# Patient Record
Sex: FEMALE | Race: WHITE | Employment: PART TIME | URBAN - NONMETROPOLITAN AREA
[De-identification: names, ages, dates, MRNs, and addresses within clinical notes are randomized per-mention and may not be internally consistent; named-entity substitution may affect disease eponyms.]

---

## 2017-03-21 ENCOUNTER — OFFICE VISIT (OUTPATIENT)
Dept: PRIMARY CARE CLINIC | Age: 57
End: 2017-03-21
Payer: MEDICAID

## 2017-03-21 VITALS
TEMPERATURE: 97.8 F | OXYGEN SATURATION: 96 % | HEIGHT: 67 IN | WEIGHT: 137 LBS | BODY MASS INDEX: 21.5 KG/M2 | HEART RATE: 77 BPM | SYSTOLIC BLOOD PRESSURE: 114 MMHG | DIASTOLIC BLOOD PRESSURE: 74 MMHG

## 2017-03-21 DIAGNOSIS — F98.8 ADD (ATTENTION DEFICIT DISORDER): ICD-10-CM

## 2017-03-21 DIAGNOSIS — Z00.00 ROUTINE PHYSICAL EXAMINATION: Primary | ICD-10-CM

## 2017-03-21 DIAGNOSIS — Z23 NEED FOR PNEUMOCOCCAL VACCINE: ICD-10-CM

## 2017-03-21 DIAGNOSIS — Z12.31 ENCOUNTER FOR SCREENING MAMMOGRAM FOR BREAST CANCER: ICD-10-CM

## 2017-03-21 DIAGNOSIS — Z23 NEED FOR INFLUENZA VACCINATION: ICD-10-CM

## 2017-03-21 DIAGNOSIS — F32.9 REACTIVE DEPRESSION: ICD-10-CM

## 2017-03-21 PROCEDURE — 90471 IMMUNIZATION ADMIN: CPT | Performed by: NURSE PRACTITIONER

## 2017-03-21 PROCEDURE — 90732 PPSV23 VACC 2 YRS+ SUBQ/IM: CPT | Performed by: NURSE PRACTITIONER

## 2017-03-21 PROCEDURE — 99386 PREV VISIT NEW AGE 40-64: CPT | Performed by: NURSE PRACTITIONER

## 2017-03-21 PROCEDURE — 90472 IMMUNIZATION ADMIN EACH ADD: CPT | Performed by: NURSE PRACTITIONER

## 2017-03-21 PROCEDURE — 90630 INFLUENZA, QUADRIVALENT, INTRADERMAL, PF (FLUZONE QUADRIVALENT PF ID): CPT | Performed by: NURSE PRACTITIONER

## 2017-03-21 RX ORDER — IBUPROFEN 200 MG
200 TABLET ORAL EVERY 6 HOURS PRN
COMMUNITY

## 2017-03-21 RX ORDER — DEXTROAMPHETAMINE SACCHARATE, AMPHETAMINE ASPARTATE, DEXTROAMPHETAMINE SULFATE AND AMPHETAMINE SULFATE 7.5; 7.5; 7.5; 7.5 MG/1; MG/1; MG/1; MG/1
30 TABLET ORAL 2 TIMES DAILY
COMMUNITY
Start: 2017-03-06 | End: 2018-10-22 | Stop reason: ALTCHOICE

## 2017-03-21 RX ORDER — ESCITALOPRAM OXALATE 10 MG/1
10 TABLET ORAL DAILY
Qty: 30 TABLET | Refills: 11 | Status: SHIPPED | OUTPATIENT
Start: 2017-03-21

## 2017-03-21 ASSESSMENT — ENCOUNTER SYMPTOMS
NAUSEA: 0
COUGH: 0
CONSTIPATION: 0
SINUS PRESSURE: 0
SHORTNESS OF BREATH: 0
TROUBLE SWALLOWING: 0
ABDOMINAL PAIN: 0
SORE THROAT: 0
RHINORRHEA: 0
DIARRHEA: 0
VOMITING: 0

## 2017-03-21 ASSESSMENT — PATIENT HEALTH QUESTIONNAIRE - PHQ9
1. LITTLE INTEREST OR PLEASURE IN DOING THINGS: 1
8. MOVING OR SPEAKING SO SLOWLY THAT OTHER PEOPLE COULD HAVE NOTICED. OR THE OPPOSITE, BEING SO FIGETY OR RESTLESS THAT YOU HAVE BEEN MOVING AROUND A LOT MORE THAN USUAL: 0
6. FEELING BAD ABOUT YOURSELF - OR THAT YOU ARE A FAILURE OR HAVE LET YOURSELF OR YOUR FAMILY DOWN: 3
10. IF YOU CHECKED OFF ANY PROBLEMS, HOW DIFFICULT HAVE THESE PROBLEMS MADE IT FOR YOU TO DO YOUR WORK, TAKE CARE OF THINGS AT HOME, OR GET ALONG WITH OTHER PEOPLE: 1
5. POOR APPETITE OR OVEREATING: 3
4. FEELING TIRED OR HAVING LITTLE ENERGY: 3
7. TROUBLE CONCENTRATING ON THINGS, SUCH AS READING THE NEWSPAPER OR WATCHING TELEVISION: 0
SUM OF ALL RESPONSES TO PHQ QUESTIONS 1-9: 13
9. THOUGHTS THAT YOU WOULD BE BETTER OFF DEAD, OR OF HURTING YOURSELF: 0
SUM OF ALL RESPONSES TO PHQ9 QUESTIONS 1 & 2: 4
2. FEELING DOWN, DEPRESSED OR HOPELESS: 3
3. TROUBLE FALLING OR STAYING ASLEEP: 0

## 2017-03-23 DIAGNOSIS — Z00.00 ROUTINE PHYSICAL EXAMINATION: ICD-10-CM

## 2017-03-23 LAB
ALBUMIN SERPL-MCNC: 4.3 G/DL (ref 3.5–5.2)
ALP BLD-CCNC: 101 U/L (ref 35–104)
ALT SERPL-CCNC: 10 U/L (ref 5–33)
ANION GAP SERPL CALCULATED.3IONS-SCNC: 19 MMOL/L (ref 7–19)
AST SERPL-CCNC: 22 U/L (ref 5–32)
BASOPHILS ABSOLUTE: 0 K/UL (ref 0–0.2)
BASOPHILS RELATIVE PERCENT: 0.4 % (ref 0–1)
BILIRUB SERPL-MCNC: 0.4 MG/DL (ref 0.2–1.2)
BUN BLDV-MCNC: 11 MG/DL (ref 6–20)
CALCIUM SERPL-MCNC: 9.8 MG/DL (ref 8.6–10)
CHLORIDE BLD-SCNC: 102 MMOL/L (ref 98–111)
CHOLESTEROL, TOTAL: 219 MG/DL (ref 160–199)
CO2: 22 MMOL/L (ref 22–29)
CREAT SERPL-MCNC: 0.7 MG/DL (ref 0.5–0.9)
EOSINOPHILS ABSOLUTE: 0.2 K/UL (ref 0–0.6)
EOSINOPHILS RELATIVE PERCENT: 2.3 % (ref 0–5)
GFR NON-AFRICAN AMERICAN: >60
GLOBULIN: 3.7 G/DL
GLUCOSE BLD-MCNC: 87 MG/DL (ref 74–109)
HCT VFR BLD CALC: 39.5 % (ref 37–47)
HDLC SERPL-MCNC: 60 MG/DL (ref 65–121)
HEMOGLOBIN: 13.1 G/DL (ref 12–16)
LDL CHOLESTEROL CALCULATED: 145 MG/DL
LYMPHOCYTES ABSOLUTE: 1.7 K/UL (ref 1.1–4.5)
LYMPHOCYTES RELATIVE PERCENT: 22.8 % (ref 20–40)
MCH RBC QN AUTO: 31.6 PG (ref 27–31)
MCHC RBC AUTO-ENTMCNC: 33.2 G/DL (ref 33–37)
MCV RBC AUTO: 95.4 FL (ref 81–99)
MONOCYTES ABSOLUTE: 0.9 K/UL (ref 0–0.9)
MONOCYTES RELATIVE PERCENT: 11.9 % (ref 0–10)
NEUTROPHILS ABSOLUTE: 4.6 K/UL (ref 1.5–7.5)
NEUTROPHILS RELATIVE PERCENT: 62.6 % (ref 50–65)
PDW BLD-RTO: 13.5 % (ref 11.5–14.5)
PLATELET # BLD: 298 K/UL (ref 130–400)
PMV BLD AUTO: 10.6 FL (ref 7.4–10.4)
POTASSIUM SERPL-SCNC: 4.3 MMOL/L (ref 3.5–5)
RBC # BLD: 4.14 M/UL (ref 4.2–5.4)
SODIUM BLD-SCNC: 143 MMOL/L (ref 136–145)
T4 FREE: 0.9 NG/ML (ref 0.9–1.7)
TOTAL PROTEIN: 8 G/DL (ref 6.6–8.7)
TRIGL SERPL-MCNC: 69 MG/DL (ref 150–199)
TSH SERPL DL<=0.05 MIU/L-ACNC: 2.89 UIU/ML (ref 0.27–4.2)
WBC # BLD: 7.4 K/UL (ref 4.8–10.8)

## 2017-03-24 ENCOUNTER — TELEPHONE (OUTPATIENT)
Dept: PRIMARY CARE CLINIC | Age: 57
End: 2017-03-24

## 2017-03-24 DIAGNOSIS — E78.2 MIXED HYPERLIPIDEMIA: Primary | ICD-10-CM

## 2017-03-28 ENCOUNTER — TELEPHONE (OUTPATIENT)
Dept: PRIMARY CARE CLINIC | Age: 57
End: 2017-03-28

## 2017-03-28 DIAGNOSIS — Z12.11 COLON CANCER SCREENING: Primary | ICD-10-CM

## 2017-03-28 RX ORDER — ATORVASTATIN CALCIUM 40 MG/1
40 TABLET, FILM COATED ORAL DAILY
Qty: 30 TABLET | Refills: 3 | Status: SHIPPED | OUTPATIENT
Start: 2017-03-28 | End: 2017-04-13 | Stop reason: SDUPTHER

## 2017-04-13 ENCOUNTER — HOSPITAL ENCOUNTER (OUTPATIENT)
Dept: GENERAL RADIOLOGY | Age: 57
Discharge: HOME | End: 2017-04-13
Payer: COMMERCIAL

## 2017-04-13 ENCOUNTER — TELEPHONE (OUTPATIENT)
Dept: PRIMARY CARE CLINIC | Age: 57
End: 2017-04-13

## 2017-04-13 ENCOUNTER — TELEPHONE (OUTPATIENT)
Dept: GASTROENTEROLOGY | Age: 57
End: 2017-04-13

## 2017-04-13 ENCOUNTER — OFFICE VISIT (OUTPATIENT)
Dept: PRIMARY CARE CLINIC | Age: 57
End: 2017-04-13
Payer: MEDICAID

## 2017-04-13 VITALS
HEART RATE: 74 BPM | OXYGEN SATURATION: 98 % | HEIGHT: 66 IN | TEMPERATURE: 97.9 F | WEIGHT: 132 LBS | BODY MASS INDEX: 21.21 KG/M2 | DIASTOLIC BLOOD PRESSURE: 64 MMHG | SYSTOLIC BLOOD PRESSURE: 108 MMHG

## 2017-04-13 DIAGNOSIS — R07.9 CHEST PAIN, UNSPECIFIED TYPE: Primary | ICD-10-CM

## 2017-04-13 DIAGNOSIS — R93.89 ABNORMAL X-RAY: ICD-10-CM

## 2017-04-13 DIAGNOSIS — R93.89 ABNORMAL X-RAY: Primary | ICD-10-CM

## 2017-04-13 DIAGNOSIS — Z12.11 SCREENING FOR COLON CANCER: ICD-10-CM

## 2017-04-13 DIAGNOSIS — R07.9 CHEST PAIN, UNSPECIFIED TYPE: ICD-10-CM

## 2017-04-13 PROCEDURE — 93000 ELECTROCARDIOGRAM COMPLETE: CPT | Performed by: NURSE PRACTITIONER

## 2017-04-13 PROCEDURE — 71020 XR CHEST STANDARD TWO VW: CPT

## 2017-04-13 PROCEDURE — 99213 OFFICE O/P EST LOW 20 MIN: CPT | Performed by: NURSE PRACTITIONER

## 2017-04-13 RX ORDER — ATORVASTATIN CALCIUM 40 MG/1
40 TABLET, FILM COATED ORAL DAILY
Qty: 30 TABLET | Refills: 11 | Status: SHIPPED | OUTPATIENT
Start: 2017-04-13 | End: 2018-10-22 | Stop reason: SDUPTHER

## 2017-04-13 ASSESSMENT — ENCOUNTER SYMPTOMS
SHORTNESS OF BREATH: 1
VOMITING: 0
TROUBLE SWALLOWING: 0
DIARRHEA: 0
COUGH: 0
NAUSEA: 0
RHINORRHEA: 0
SORE THROAT: 0
ABDOMINAL PAIN: 0
SINUS PRESSURE: 0
CONSTIPATION: 0

## 2017-04-18 DIAGNOSIS — R93.89 ABNORMAL X-RAY: Primary | ICD-10-CM

## 2017-04-27 ENCOUNTER — HOSPITAL ENCOUNTER (OUTPATIENT)
Dept: GENERAL RADIOLOGY | Age: 57
Discharge: HOME | End: 2017-04-27
Payer: MEDICAID

## 2017-04-27 DIAGNOSIS — R93.89 ABNORMAL X-RAY: ICD-10-CM

## 2017-04-27 PROCEDURE — 71250 CT THORAX DX C-: CPT

## 2017-04-28 ENCOUNTER — TELEPHONE (OUTPATIENT)
Dept: PRIMARY CARE CLINIC | Age: 57
End: 2017-04-28

## 2017-05-02 ENCOUNTER — OFFICE VISIT (OUTPATIENT)
Dept: PRIMARY CARE CLINIC | Age: 57
End: 2017-05-02
Payer: MEDICAID

## 2017-05-02 VITALS
SYSTOLIC BLOOD PRESSURE: 112 MMHG | HEIGHT: 66 IN | HEART RATE: 83 BPM | WEIGHT: 132.75 LBS | TEMPERATURE: 98.3 F | OXYGEN SATURATION: 97 % | DIASTOLIC BLOOD PRESSURE: 74 MMHG | BODY MASS INDEX: 21.33 KG/M2

## 2017-05-02 DIAGNOSIS — J84.9 INTERSTITIAL LUNG DISEASE (HCC): ICD-10-CM

## 2017-05-02 DIAGNOSIS — J43.9 PULMONARY EMPHYSEMA, UNSPECIFIED EMPHYSEMA TYPE (HCC): ICD-10-CM

## 2017-05-02 DIAGNOSIS — J84.9 INTERSTITIAL LUNG DISEASE (HCC): Primary | ICD-10-CM

## 2017-05-02 DIAGNOSIS — E78.2 MIXED HYPERLIPIDEMIA: ICD-10-CM

## 2017-05-02 DIAGNOSIS — Z72.0 TOBACCO USE: ICD-10-CM

## 2017-05-02 LAB
ALBUMIN SERPL-MCNC: 4.1 G/DL (ref 3.5–5.2)
ALP BLD-CCNC: 104 U/L (ref 35–104)
ALT SERPL-CCNC: 8 U/L (ref 5–33)
ANION GAP SERPL CALCULATED.3IONS-SCNC: 14 MMOL/L (ref 7–19)
AST SERPL-CCNC: 19 U/L (ref 5–32)
BILIRUB SERPL-MCNC: 0.3 MG/DL (ref 0.2–1.2)
BILIRUBIN DIRECT: 0.1 MG/DL (ref 0–0.3)
BILIRUBIN, INDIRECT: 0.2 MG/DL (ref 0.1–1)
BUN BLDV-MCNC: 7 MG/DL (ref 6–20)
C-REACTIVE PROTEIN: 1.6 MG/L (ref 0–5)
CALCIUM SERPL-MCNC: 9.3 MG/DL (ref 8.6–10)
CHLORIDE BLD-SCNC: 102 MMOL/L (ref 98–111)
CO2: 27 MMOL/L (ref 22–29)
CREAT SERPL-MCNC: 0.7 MG/DL (ref 0.5–0.9)
GFR NON-AFRICAN AMERICAN: >60
GLOBULIN: 3.1 G/DL
GLUCOSE BLD-MCNC: 60 MG/DL (ref 74–109)
POTASSIUM SERPL-SCNC: 4 MMOL/L (ref 3.5–5)
RHEUMATOID FACTOR: <10 IU/ML
SODIUM BLD-SCNC: 143 MMOL/L (ref 136–145)
TOTAL PROTEIN: 7.2 G/DL (ref 6.6–8.7)

## 2017-05-02 PROCEDURE — 99213 OFFICE O/P EST LOW 20 MIN: CPT | Performed by: NURSE PRACTITIONER

## 2017-05-02 RX ORDER — NICOTINE 21 MG/24HR
1 PATCH, TRANSDERMAL 24 HOURS TRANSDERMAL EVERY 24 HOURS
Qty: 30 PATCH | Refills: 3 | Status: SHIPPED | OUTPATIENT
Start: 2017-05-02

## 2017-05-02 ASSESSMENT — ENCOUNTER SYMPTOMS
VOMITING: 0
TROUBLE SWALLOWING: 0
SHORTNESS OF BREATH: 1
COUGH: 0
RHINORRHEA: 0
SINUS PRESSURE: 0
ABDOMINAL PAIN: 0
SORE THROAT: 0
DIARRHEA: 0
CONSTIPATION: 0
NAUSEA: 0

## 2017-05-04 LAB — ANA IGG, ELISA: NORMAL

## 2017-05-05 LAB
ENA TO SSB (LA) ANTIBODY: 1 AU/ML (ref 0–40)
SSA 52 (RO) (ENA) AB, IGG: 1 AU/ML (ref 0–40)
SSA 60 (RO) (ENA) AB, IGG: 1 AU/ML (ref 0–40)

## 2017-05-08 ENCOUNTER — TELEPHONE (OUTPATIENT)
Dept: PRIMARY CARE CLINIC | Age: 57
End: 2017-05-08

## 2017-05-08 LAB — SEDIMENTATION RATE, ERYTHROCYTE: 30 MM/HR (ref 0–25)

## 2017-05-09 ENCOUNTER — TELEPHONE (OUTPATIENT)
Dept: PRIMARY CARE CLINIC | Age: 57
End: 2017-05-09

## 2018-03-08 ENCOUNTER — HOSPITAL ENCOUNTER (OUTPATIENT)
Dept: GENERAL RADIOLOGY | Age: 58
Discharge: HOME | End: 2018-03-08
Payer: MEDICAID

## 2018-03-08 DIAGNOSIS — J98.4 SCARRING OF LUNG: ICD-10-CM

## 2018-03-08 PROCEDURE — 71250 CT THORAX DX C-: CPT

## 2018-10-01 ENCOUNTER — OFFICE VISIT (OUTPATIENT)
Dept: CARDIOLOGY | Age: 58
End: 2018-10-01
Payer: MEDICAID

## 2018-10-01 VITALS
DIASTOLIC BLOOD PRESSURE: 66 MMHG | SYSTOLIC BLOOD PRESSURE: 126 MMHG | WEIGHT: 130 LBS | HEART RATE: 60 BPM | HEIGHT: 66 IN | BODY MASS INDEX: 20.89 KG/M2

## 2018-10-01 DIAGNOSIS — R00.2 PALPITATIONS: ICD-10-CM

## 2018-10-01 DIAGNOSIS — E78.5 HYPERLIPIDEMIA, UNSPECIFIED HYPERLIPIDEMIA TYPE: ICD-10-CM

## 2018-10-01 DIAGNOSIS — J43.9 PULMONARY EMPHYSEMA, UNSPECIFIED EMPHYSEMA TYPE (HCC): Primary | ICD-10-CM

## 2018-10-01 DIAGNOSIS — R06.09 DOE (DYSPNEA ON EXERTION): ICD-10-CM

## 2018-10-01 DIAGNOSIS — Z82.49 FAMILY HISTORY OF CORONARY ARTERY DISEASE: ICD-10-CM

## 2018-10-01 PROCEDURE — 99213 OFFICE O/P EST LOW 20 MIN: CPT | Performed by: NURSE PRACTITIONER

## 2018-10-01 PROCEDURE — 0296T PR EXT ECG > 48HR TO 21 DAY RCRD W/CONECT INTL RCRD: CPT | Performed by: NURSE PRACTITIONER

## 2018-10-01 PROCEDURE — 93000 ELECTROCARDIOGRAM COMPLETE: CPT | Performed by: NURSE PRACTITIONER

## 2018-10-01 NOTE — PATIENT INSTRUCTIONS
not smoke or eat a heavy meal before this test.  · Wear flat, comfortable shoes (no bedroom slippers) and loose, lightweight shorts or sweatpants. Walking or running shoes are best.  · Tell your doctor if:  Tressashan Horowitzer You are taking any medicines. ¨ You are taking medicine for an erection problem, such as sildenafil (Viagra), tadalafil (Cialis), and vardenafil (Levitra). You may need to take nitroglycerin during this test, which can cause a serious reaction if you have taken a medicine for an erection problem within the past 48 hours. ¨ You have had bleeding problems, or if you take aspirin or some other blood thinner. ¨ You have joint problems in your hips or legs that may make it hard for you to exercise. ¨ You have a heart valve problem. What happens during the test?  You will first have an echocardiogram before exercising. This is called the baseline. You then exercise for a specific amount of time and then have another echocardiogram.  · You will take off all or most of your clothes and change into a gown. · You will lie on your back or on your left side on a bed or table. · You may receive medicine through a vein (intravenously, or IV). The IV can be used to give you a contrast material, which helps your doctor get good views of your heart. · Small pads or patches (electrodes) will be taped to your arms and legs to record your heart rate during the test.  · A small amount of gel will be rubbed on the left side of your chest to help  the sound waves. · The transducer will be pressed firmly against your chest and moved slowly back and forth. It is usually moved to different areas on your chest to get specific views of your heart. · You will be asked to do several things, such as hold very still, breathe in and out very slowly, hold your breath, or lie on your left side. This test usually takes 30 to 60 minutes.   When the echocardiogram is finished, you will exercise and then have another echocardiogram. If you are not able to exercise, you may be given medicine that stimulates your heart to beat harder and faster, as if you were exercising. You most likely will either walk on a treadmill or pedal a stationary bicycle. While you exercise:  · Your heart rate and blood pressure are recorded. · You might be asked to use numbers to say how hard you are exercising. The higher the number, the harder you think you are exercising. · You will continue to exercise until you or your doctor feels you need to stop. · You will then lie on a bed or table, and another echocardiogram will be done. An exercise stress echocardiogram takes about 30 to 60 minutes. What else should you know about the test?  · No electricity passes through your body during the test. There is no danger of getting an electrical shock. · During the tests, tell your doctor if:  ¨ You have chest pain. ¨ You are very short of breath. ¨ You are lightheaded. ¨ You have other symptoms. What happens after the test?  · You will be able to sit or lie down and rest.  · Your heart rate and blood pressure will be checked for about 5 to 10 minutes. · You can go back to your usual activities right away. When should you call for help? Call 911 anytime you think you may need emergency care. For example, call if:  · You passed out (lost consciousness). · You have symptoms of a heart attack, such as:  ¨ Chest pain or pressure. ¨ Sweating. ¨ Shortness of breath. ¨ Nausea or vomiting. ¨ Pain that spreads from the chest to the neck, jaw, or one or both shoulders or arms. ¨ Dizziness or lightheadedness. ¨ A fast or uneven pulse. After calling 911, chew 1 adult-strength aspirin. Wait for an ambulance. Do not try to drive yourself. Watch closely for changes in your health, and be sure to contact your doctor if:  · You do not get better as expected. Follow-up care is a key part of your treatment and safety.  Be sure to make and go to all appointments, and call about it. You know you should quit smoking, but where do you start? Knowing what you are up against can help you form a successful plan to quit smoking. The Mind and Body Connection   Smoking is addictiveboth physically and psychologically. The physical addiction can be traced to the nicotine in each cigarette. It hooks you just as completely as other drugs, say michael, and the withdrawal symptomscravings, anxiety , nausea, cramps, depression , and dizzinessare similar. Nicotine surges through the bloodstream and gives smokers a higha quick jolt that makes them think they feel better. But, what really happens is that smokers develop a tolerance for nicotine, which is why they increase from one pack a day to two packs a day. The psychological addiction is, in its own way, just as bad. Smoking becomes second nature, like blinking or breathing. If you consider that one pack of cigarettes can turn into 150 to 200 puffs a day, seven days a week, 52 weeks a year, you will see how hard it is to de-program yourself. The Key to Quitting   \"You know, there is no magic bullet, no device that will make it easy,\" says Dena Yin, who smoked for 13 years before quitting in 1989, and has written a book and taught seminars on quitting. But, you can quit. \"The thing to keep in mind is that almost everyone who quits has to try more than once,\" says Darryn Chavez MD, a past president of the [employer]. \"You should not be discouraged. It is more rare to quit on the first try than on the fifth\"   The key to quitting, say the experts, is patience, perseverance, and having a plan. How to Do It   Keep these points in mind when you quit:   Know Why Cash Flick a reason that you believe in, be it for your family or for yourself. If you do not believe in your reason, it is that much harder to quit.    Change Your Environment   Worry about not smoking for just one day, and not for the rest of your life. Besides, it gets easier to stave off the desire the longer you do not smoke. The nicotine will be gone from your system in 3-5 days, and after about a month the worst of the withdrawal symptoms will go away. Taper Off   Some studies show that a majority of permanent quitters achieved their goal by quitting \"cold turkey. \" But, there are many other options, like slowly decreasing the number of cigarettes you smoke. The key to tapering off is to cut down the number of cigarettes you smoke each day. One way to do this, says Amina, is to delay the first cigarette of the day. She recommends the two-hour approach. If you have your first smoke at 7 a.m., try holding out until 9 for a couple of days. Then, push it back until 11, and so on. By the end of four weeks, you will not be smoking at all. Whether you taper or quit cold, your goal must be the same: abstinence. If you choose to taper, do not let the process give you an excuse to delay the final step of quitting entirely. Set a quit day and stick to it. Overwhelm the Addiction   Think about the things that lead to lighting up, and do not do them. Get rid of the ashtrays at home. Do not come back from lunch 15 minutes early to sneak in a cigarette break. Avoid places where smoking is part of the atmosphere. Practice the Three D's   Delay; deep breathing; drink water. When you feel like a smoke, delay. Try to think of something else. Breathe deeply, and count to ten slowly as you do so. Drink water; aim for eight, eight-ounces (240 milliliters) a day, which helps flush the nicotine out of your system. Do something else, like chew gum, until the craving passes. Keep a Diary   This technique, which has also been used effectively with people who eat too much, is surprisingly effective. Each time you feel like a cigarette, write down the time of day, what you are doing, and how badly you want to smoke on a scale of 1 to 3, with 1 for the worst craving.  A varenicline   · Prescription antidepressant bupropion   · Alternative therapies, like hypnosis and acupuncture   · Smoking cessation classes   · Self-help programsFor example, web and computer-based programs are an option. You can find many programs online, like the American Lung Association's [place] From Smoking . There are also telephone quit lines, cell phone programs, and text messaging programs. To learn more about these options, visit smokefree. gov . · Group therapy   Trying a combination of these options may work best for you. For example, using a nicotine patch and going to group therapy may help you to become smoke-free. Reward Yourself for Succeeding   Quitting is hard and you deserve a reward for meeting your short-term goals like being quit for one week, two weeks, or a month. Give yourself something you really want but have been putting off getting. Remember how much money you are saving by not buying cigarettes!      Last Reviewed: March 2011 Patricio Dodson MD   Updated: 3/15/2011

## 2018-10-01 NOTE — PROGRESS NOTES
Dear Frances Early, DIONE - NP & Eloisa Cooks, APRN,    Thank you for allowing me to participate in the care of Ms. Byron Weldon. She presents today at the [address] [place] [address] at [facility] As you know, Ms. Kathe Franco is a 63 y.o. female with history of hyperlipidemia and smoking who presents with the chief complaint of being told she had an abnormal ECG. She went to [facility] in August for chest pain that was worse with breathing. They did an ECG which showed PVC. She was treated for pleurisy and sent to us for evaluation. Her pleurisy symptoms have resolved. She does have a significant family history of heart disease. Bother her brother and da had heart problems. She is a smoker She smokes 1 PPD. She has a history of hyperlipidemia and was on Lipitor in past but has not take this in 3 months. She states that she ran out. She is able to sweep and vacuum without any chest pain or SOA. She does have CARRASCO if she walks up a hill. She does have a fluttering and fast heart rate sensation that occurs on a weekly basis and lasts less than 10 seconds. She has no chest pain or SOA with this. Her BP has been controlled. She otherwise denies chest pain, SOA, CARRASCO, PND, orthopnea, syncope or near syncope. She has no other complaints. Review of Systems   Constitutional: Negative for fever, chills, diaphoresis, activity change, appetite change, fatigue and unexpected weight change. Eyes: Negative for photophobia, pain, redness and visual disturbance. Respiratory: Positive for CARRASCO. Negative for apnea, cough, chest tightness, wheezing and stridor. Cardiovascular: Positive for palpitations. Negative for chest pain and leg swelling. Gastrointestinal: Negative for abdominal distention. Genitourinary: Negative for dysuria, urgency and frequency. Musculoskeletal: Negative for myalgias, arthralgias and gait problem. Skin: Negative for color change, pallor, rash and wound.    Neurological: Negative for dizziness, tremors, speech difficulty, weakness and numbness. Hematological: Does not bruise/bleed easily. Psychiatric/Behavioral: Negative. Past Medical History:   Diagnosis Date    ADHD (attention deficit hyperactivity disorder)     Depression        Past Surgical History:   Procedure Laterality Date    HYSTERECTOMY      partial, dr Kasia [facility] in [facility]     KNEE SURGERY Right     knee scope       Family History   Problem Relation Age of Onset    Other Mother         brain aneurysm    Heart Disease Father         heart attack       Social History     Social History    Marital status: Unknown     Spouse name: N/A    Number of children: N/A    Years of education: N/A     Occupational History    Not on file. Social History Main Topics    Smoking status: Current Every Day Smoker     Packs/day: 1.00     Types: Cigarettes    Smokeless tobacco: Never Used    Alcohol use No    Drug use: No    Sexual activity: Not on file     Other Topics Concern    Not on file     Social History Narrative    No narrative on file       No Known Allergies      Current Outpatient Prescriptions:     ibuprofen (ADVIL;MOTRIN) 200 MG tablet, Take 200 mg by mouth every 6 hours as needed for Pain, Disp: , Rfl:     nicotine (NICODERM CQ) 21 MG/24HR, Place 1 patch onto the skin every 24 hours, Disp: 30 patch, Rfl: 3    atorvastatin (LIPITOR) 40 MG tablet, Take 1 tablet by mouth daily, Disp: 30 tablet, Rfl: 11    amphetamine-dextroamphetamine (ADDERALL) 30 MG tablet, Take 30 mg by mouth 2 times daily  Indications: filled by Dr Marine Ascencio in [facility] ., Disp: , Rfl:     escitalopram (LEXAPRO) 10 MG tablet, Take 1 tablet by mouth daily, Disp: 30 tablet, Rfl: 11    PE:  Vitals:    10/01/18 0847   BP: 126/66   Pulse: 60       Estimated body mass index is 20.98 kg/m² as calculated from the following:    Height as of this encounter: 5' 6\" (1.676 m). Weight as of this encounter: 130 lb (59 kg).     Constitutional: She is oriented to person, place, and time. She appears well-developed and well-nourished in no acute distress. Head: Normocephalic and atraumatic. Neck:  Neck supple without JVD present. Cardiovascular: Normal rate, Normal rhythm, normal heart sounds. no murmur ascultated. No gallop and no friction rub. No carotid bruits. No peripheral edema. Pulmonary/Chest:  Lungs clear to auscultation bilaterally without evidence of respiratory distress. She without wheezes. She without rales or ronchi. Musculoskeletal: Normal range of motion. Gait is normal.  Neurological: She is alert and oriented to person, place, and time. Skin: Skin is warm and dry without rash or pallor. Psychiatric: She has a normal mood and affect. Her behavior is normal. Thought content normal.     Lab Results   Component Value Date    CREATININE 0.7 05/02/2017    CREATININE 0.7 03/23/2017    HGB 13.1 03/23/2017     EKG- Fast Pace- 8/16/18   NSR, PVC, HR 79       Assessment, Recommendations, & Plan:  63 y.o. female with HLD, CARRASCO, & Palpitations    HLD- Will check a lipid panel and LFT's    CARRASCO- Stress echo    Palpitations- Ziopatch      Disposition - RTC in 5 weeks or sooner if needed      Please do not hesitate to contact me for any questions or concerns.     Sincerely yours,    DIONE Aceves

## 2018-10-01 NOTE — LETTER
[facility]  [address] [address]  Phone: [phone]  Fax: [phone]    DIONE Infante        October 1, 2018     Patient: Messi Guo   YOB: 1960   Date of Visit: 10/1/2018       To Whom it May Concern:    Messi Guo was seen in my clinic on 10/1/2018. She may return to work on 10/1/2018. If you have any questions or concerns, please don't hesitate to call.     Sincerely,         Svetlana Dumas APRN

## 2018-10-11 DIAGNOSIS — R06.09 DOE (DYSPNEA ON EXERTION): ICD-10-CM

## 2018-10-11 DIAGNOSIS — E78.5 HYPERLIPIDEMIA, UNSPECIFIED HYPERLIPIDEMIA TYPE: ICD-10-CM

## 2018-10-11 LAB
ALT SERPL-CCNC: 12 U/L (ref 5–33)
AST SERPL-CCNC: 18 U/L (ref 5–32)
CHOLESTEROL, TOTAL: 189 MG/DL (ref 160–199)
HDLC SERPL-MCNC: 49 MG/DL (ref 65–121)
LDL CHOLESTEROL CALCULATED: 124 MG/DL
TRIGL SERPL-MCNC: 80 MG/DL (ref 0–149)

## 2018-10-15 ENCOUNTER — HOSPITAL ENCOUNTER (OUTPATIENT)
Dept: NON INVASIVE DIAGNOSTICS | Age: 58
Discharge: HOME | End: 2018-10-15
Payer: MEDICAID

## 2018-10-15 DIAGNOSIS — R06.09 DOE (DYSPNEA ON EXERTION): ICD-10-CM

## 2018-10-18 ENCOUNTER — HOSPITAL ENCOUNTER (OUTPATIENT)
Dept: NON INVASIVE DIAGNOSTICS | Age: 58
Discharge: HOME | End: 2018-10-18
Payer: MEDICAID

## 2018-10-18 LAB
LV EF: 50 %
LVEF MODALITY: NORMAL

## 2018-10-18 PROCEDURE — 93350 STRESS TTE ONLY: CPT

## 2018-10-22 ENCOUNTER — OFFICE VISIT (OUTPATIENT)
Dept: CARDIOLOGY | Age: 58
End: 2018-10-22
Payer: MEDICAID

## 2018-10-22 VITALS
HEIGHT: 66 IN | SYSTOLIC BLOOD PRESSURE: 124 MMHG | HEART RATE: 66 BPM | WEIGHT: 128 LBS | DIASTOLIC BLOOD PRESSURE: 64 MMHG | BODY MASS INDEX: 20.57 KG/M2

## 2018-10-22 DIAGNOSIS — E78.5 HYPERLIPIDEMIA, UNSPECIFIED HYPERLIPIDEMIA TYPE: Primary | ICD-10-CM

## 2018-10-22 PROCEDURE — 99213 OFFICE O/P EST LOW 20 MIN: CPT | Performed by: NURSE PRACTITIONER

## 2018-10-22 RX ORDER — ATORVASTATIN CALCIUM 40 MG/1
40 TABLET, FILM COATED ORAL DAILY
Qty: 30 TABLET | Refills: 3 | Status: SHIPPED | OUTPATIENT
Start: 2018-10-22

## 2018-10-22 NOTE — PROGRESS NOTES
and oriented to person, place, and time. Skin: Skin is warm and dry without rash or pallor. Psychiatric: She has a normal mood and affect. Her behavior is normal. Thought content normal.     Lab Results   Component Value Date    CREATININE 0.7 05/02/2017    CREATININE 0.7 03/23/2017    HGB 13.1 03/23/2017     Stress echo- 10/19/18  Stress echocardiogram without clinical, electrocardiographic, or   echocardiographic evidence of myocardial ischemia.   Kern Treadmill Score was 7.0.   There is a low risk of myocardial ischemia.   Test was supervised by Dr. Gloria Oswald.       Assessment, Recommendations, & Plan:  63 y.o. female with HLD, CARRASCO, & Palpitations    HLD- Lipid panel reviewed  Total Cholesterol- 189  Triglycerides- 80  HDL- 49  LDL- 124  Will start back on Lipitor 40 mg daily. I will re-check Lipid panel and LFT's in 2 months. CARRASCO- Stress echo was normal. encouraged smoking cessation    Palpitations- Pending results. She has not mailed in her monitor yet. She will put this in the mail and I will call her with results. Disposition - RTC in 1 year or sooner if needed      Please do not hesitate to contact me for any questions or concerns.     Sincerely yours,    Renée Veras, APRN

## 2018-10-22 NOTE — PATIENT INSTRUCTIONS
Hyperlipidemia   (Dyslipidemia; High Triglycerides; Triglycerides, High)     Definition   Hyperlipidemia is a high level of fats in the blood. These fats, called lipids, include cholesterol and triglycerides. There are five types of hyperlipidemia. The type depends on which lipid in the blood is high. Causes   Causes may include:   A family history of hyperlipidemia   A diet high in total fat, saturated fat, or cholesterol   Obesity   Excess alcohol intake   Certain conditions, including:   Diabetes   Low thyroid   Kidney problems   Liver disease   Cushing's syndrome   Certain drugs, such as:   Hormones or birth control pills   Beta-blockers   Some diuretics   Cortisone drugs   Isotretinoin (for acne )   Some anti- HIV drugs   Risk Factors   These factors increase your chance of developing this condition. Tell your doctor if you have any of these:   Advancing age   Sex: male   Postmenopause   Lack of exercise   Smoking   Stress   Overuse of alcohol   Symptoms   Hyperlipidemia usually does not cause symptoms. Very high levels of lipids or triglycerides can cause:   Fat deposits in the skin or tendons ( xanthomas )   Pain, enlargement, or swelling of organs such as the liver, spleen, or pancreas ( pancreatitis )   Obstruction of blood vessels in heart and brain   Hyperlipidemia can increase your risk of atherosclerosis . This is a dangerous hardening of the arteries. It can end up blocking blood flow. In some cases, this may result in:   Angina   Heart attack   Stroke   Other serious complications   Blood Vessel with Atherosclerosis       [address] [place] [address].   Diagnosis   This condition is diagnosed with blood tests. These tests measure the levels of lipids in the blood. The National Cholesterol Education Program advises that you have your lipids checked at least once every five years, starting at age 21.  Also, the American Academy of Pediatrics recommends lipid screening for children at risk (eg, a doctor about medications you are taking. They may have side effects that cause hyperlipidemia. Last Reviewed: September 2010 Alyce Smith DO   Updated: 11/9/2010     QUIT [facility] SMOKING CESSATION PROGRAM    How Do I Get Started  Quitting tobacco is a process. The first step is the hardest. When you are ready to quit, Quit Now [place] can help you with each step in the quitting process. The Program  Quit Now [place] is a FREE online service available to [place] residents 13years of age and over. When you become a member, you get special tools, a support team of coaches, research-based information, and a community of others trying to become tobacco free. Our expert coaches can talk to you about overcoming common barriers, such as dealing with stress, fighting cravings, coping with irritability, and controlling weight gain. We also offer a free telephone service, so you can speak to a  in person, if you would prefer. Call the [place] at [facility] or [phone]. What if I don't qualify for the Program?  You must be 13years of age or older to participate in the program. It is also helpful to discuss quitting with your doctor. How do I enroll in the Quit Now [place] online program?  Complete the brief Enroll Now form. If you are a [place] resident over 13years of age, you will receive an email letting you know that you are enrolled. You can use the online service as often as you want! How do I enroll in both the online and telephone program?  Complete the brief Enroll Now form. If you qualify, you will receive an email letting you know that you are enrolled. You can use the online service as often as you want! While completing the Enroll Now form you indicate the best time for a  to give you a call. If you would like, you can call the QuitLine at 0-777-QUITNOW. We're here 7 days a week. Monday - Sunday 7 a.m. - 12 a.m. CST  Monday - Sunday 8 a.m. - 1 a.m.  EST  If you

## 2019-08-08 ENCOUNTER — TELEPHONE (OUTPATIENT)
Dept: CARDIOLOGY | Age: 59
End: 2019-08-08

## 2019-10-29 ENCOUNTER — TELEPHONE (OUTPATIENT)
Dept: CARDIOLOGY | Age: 59
End: 2019-10-29

## 2019-11-18 ENCOUNTER — TELEPHONE (OUTPATIENT)
Dept: PRIMARY CARE CLINIC | Age: 59
End: 2019-11-18

## 2023-02-16 ENCOUNTER — OFFICE VISIT (OUTPATIENT)
Dept: INTERNAL MEDICINE | Facility: CLINIC | Age: 63
End: 2023-02-16
Payer: MEDICAID

## 2023-02-16 VITALS
WEIGHT: 163.2 LBS | SYSTOLIC BLOOD PRESSURE: 119 MMHG | DIASTOLIC BLOOD PRESSURE: 76 MMHG | HEART RATE: 90 BPM | OXYGEN SATURATION: 98 % | RESPIRATION RATE: 18 BRPM | TEMPERATURE: 98 F | HEIGHT: 66 IN | BODY MASS INDEX: 26.23 KG/M2

## 2023-02-16 DIAGNOSIS — S69.92XA HAND INJURY, LEFT, INITIAL ENCOUNTER: Primary | ICD-10-CM

## 2023-02-16 DIAGNOSIS — S69.92XA INJURY OF LEFT WRIST, INITIAL ENCOUNTER: ICD-10-CM

## 2023-02-16 PROCEDURE — 99203 OFFICE O/P NEW LOW 30 MIN: CPT

## 2023-02-16 NOTE — PROGRESS NOTES
Subjective     Chief Complaint   Patient presents with   • Hand Injury     Swelling. Fell on left hand yesterday.        History of Present Illness  Patient states yesterday she fell on while walking, tripped over a step and fell hitting her left hand. States is able able to make a fist, but it is painful to stretch outward.   Patient's PMR from outside medical facility reviewed and noted.    Review of Systems   Constitutional: Negative for activity change, fatigue and unexpected weight change.   HENT: Negative for mouth sores and trouble swallowing.    Eyes: Negative for discharge and visual disturbance.   Respiratory: Negative for cough and shortness of breath.    Cardiovascular: Negative for chest pain and leg swelling.   Gastrointestinal: Negative for abdominal pain, constipation, diarrhea and nausea.   Genitourinary: Negative for decreased urine volume, difficulty urinating and hematuria.   Musculoskeletal: Positive for arthralgias and joint swelling. Negative for back pain and gait problem.   Skin: Negative for color change and rash.   Allergic/Immunologic: Negative for environmental allergies and immunocompromised state.   Neurological: Negative for weakness and headaches.   Psychiatric/Behavioral: Negative for confusion and sleep disturbance.        Otherwise complete ROS reviewed and negative except as mentioned in the HPI.    Past Medical History: History reviewed. No pertinent past medical history.  Past Surgical History:History reviewed. No pertinent surgical history.  Social History:  reports that she has been smoking cigarettes. She has a 30.00 pack-year smoking history. She has never used smokeless tobacco. She reports that she does not currently use alcohol. She reports that she does not use drugs.    Family History: family history includes No Known Problems in her father and mother.      Allergies:  Allergies   Allergen Reactions   • Ceclor [Cefaclor] Hives   • Hydrocodone-Ibuprofen Unknown  "- Low Severity     Medications:  Prior to Admission medications    Not on File         Objective     Vital Signs: /76 (BP Location: Left arm, Patient Position: Sitting, Cuff Size: Adult)   Pulse 90   Temp 98 °F (36.7 °C) (Skin)   Resp 18   Ht 167.6 cm (66\")   Wt 74 kg (163 lb 3.2 oz)   SpO2 98%   BMI 26.34 kg/m²   Physical Exam  Vitals and nursing note reviewed.   Constitutional:       General: She is not in acute distress.     Appearance: Normal appearance. She is normal weight. She is not ill-appearing.   HENT:      Head: Normocephalic and atraumatic.      Nose: Nose normal.      Mouth/Throat:      Mouth: Mucous membranes are moist.      Pharynx: No posterior oropharyngeal erythema.   Eyes:      General: No scleral icterus.     Extraocular Movements: Extraocular movements intact.      Conjunctiva/sclera: Conjunctivae normal.      Pupils: Pupils are equal, round, and reactive to light.   Cardiovascular:      Rate and Rhythm: Normal rate and regular rhythm.      Pulses: Normal pulses.      Heart sounds: Normal heart sounds.   Pulmonary:      Effort: Pulmonary effort is normal. No respiratory distress.      Breath sounds: Normal breath sounds. No wheezing.   Abdominal:      General: Abdomen is flat. Bowel sounds are normal.      Palpations: Abdomen is soft.      Tenderness: There is no abdominal tenderness.   Musculoskeletal:         General: Swelling, tenderness and signs of injury present.      Cervical back: Normal range of motion.      Right lower leg: No edema.      Left lower leg: No edema.      Comments: Bruising noted to palmar side of left hand near thumb. Trace swelling noted to left thumb and anterior aspect of left hand. FROM present.   slight tenderness noted with palpation of medial wrist. No swelling or bruising noted.    Skin:     General: Skin is warm and dry.      Findings: No erythema or rash.   Neurological:      General: No focal deficit present.      Mental Status: She is alert and " oriented to person, place, and time. Mental status is at baseline.      Motor: No weakness.   Psychiatric:         Mood and Affect: Mood normal.         Behavior: Behavior normal.         Thought Content: Thought content normal.         Judgment: Judgment normal.       Results Reviewed:  ALT (SGPT)   Date Value Ref Range Status   10/11/2018 12 5 - 33 U/L Final     AST (SGOT)   Date Value Ref Range Status   10/11/2018 18 5 - 32 U/L Final     Triglycerides   Date Value Ref Range Status   10/11/2018 80 0 - 149 mg/dL Final     HDL Cholesterol   Date Value Ref Range Status   10/11/2018 49 (L) 65 - 121 mg/dL Final     Comment:     VALUES>60 MG/DL ARE ASSOCIATED WITH A DECREASED RISK OF  ATHEROSCLEROTIC CARDIOVASCULAR DISEASE     LDL Cholesterol    Date Value Ref Range Status   10/11/2018 124 <100 mg/dL Final     Comment:     <100 MG/DL=OPITIMAL    100-129 MG/DL=DESIRABLE    130-159 MG/DL BORDERLINE=INCREASED RISK OF ATHEROSCLEROTIC  CARDIOVASCULAR DISEASE    > OR = 160 MG/DL=ASSOCIATED WITH AN INCREASE RISK OF  ATHEROSCLEROTIC CARDIOVASCULAR DISEASE         Assessment / Plan     Assessment/Plan:  1. Hand injury, left, initial encounter  - Miscellaneous DME  -left hand xray     2. Injury of left wrist, initial encounter  Left wrist xray  - Miscellaneous DME    Advised to wear brace until pain is relieved, advised anti-inflammatories and ice.        No follow-ups on file. unless patient needs to be seen sooner or acute issues arise.      I have discussed the patient results/orders and and plan/recommendation with them at today's visit.      Kroi Cope, MELINA   02/16/2023

## 2023-10-04 ENCOUNTER — OFFICE VISIT (OUTPATIENT)
Dept: INTERNAL MEDICINE | Facility: CLINIC | Age: 63
End: 2023-10-04
Payer: MEDICAID

## 2023-10-04 VITALS
DIASTOLIC BLOOD PRESSURE: 97 MMHG | SYSTOLIC BLOOD PRESSURE: 169 MMHG | HEIGHT: 66 IN | TEMPERATURE: 97.8 F | HEART RATE: 73 BPM | BODY MASS INDEX: 24.29 KG/M2 | WEIGHT: 151.13 LBS | OXYGEN SATURATION: 97 %

## 2023-10-04 DIAGNOSIS — Z00.00 WELLNESS EXAMINATION: ICD-10-CM

## 2023-10-04 DIAGNOSIS — Z12.31 SCREENING MAMMOGRAM FOR BREAST CANCER: ICD-10-CM

## 2023-10-04 DIAGNOSIS — Z11.59 ENCOUNTER FOR HEPATITIS C SCREENING TEST FOR LOW RISK PATIENT: ICD-10-CM

## 2023-10-04 DIAGNOSIS — Z23 NEED FOR PNEUMOCOCCAL VACCINATION: ICD-10-CM

## 2023-10-04 DIAGNOSIS — Z23 NEED FOR INFLUENZA VACCINATION: ICD-10-CM

## 2023-10-04 DIAGNOSIS — Z72.0 TOBACCO ABUSE: ICD-10-CM

## 2023-10-04 DIAGNOSIS — J40 BRONCHITIS: ICD-10-CM

## 2023-10-04 DIAGNOSIS — R03.0 ELEVATED BLOOD PRESSURE READING: ICD-10-CM

## 2023-10-04 DIAGNOSIS — J43.9 PULMONARY EMPHYSEMA, UNSPECIFIED EMPHYSEMA TYPE: ICD-10-CM

## 2023-10-04 DIAGNOSIS — Z12.11 SCREENING FOR COLON CANCER: Primary | ICD-10-CM

## 2023-10-04 DIAGNOSIS — F32.9 REACTIVE DEPRESSION: ICD-10-CM

## 2023-10-04 DIAGNOSIS — Z12.2 SCREENING FOR LUNG CANCER: ICD-10-CM

## 2023-10-04 PROBLEM — F98.8 ADD (ATTENTION DEFICIT DISORDER): Status: ACTIVE | Noted: 2017-03-21

## 2023-10-04 PROBLEM — J84.9 INTERSTITIAL LUNG DISEASE: Status: ACTIVE | Noted: 2017-05-02

## 2023-10-04 RX ORDER — BUPROPION HYDROCHLORIDE 150 MG/1
150 TABLET ORAL DAILY
Qty: 30 TABLET | Refills: 11 | Status: SHIPPED | OUTPATIENT
Start: 2023-10-04

## 2023-10-04 RX ORDER — ALBUTEROL SULFATE 90 UG/1
2 AEROSOL, METERED RESPIRATORY (INHALATION) EVERY 4 HOURS PRN
Qty: 18 G | Refills: 11 | Status: SHIPPED | OUTPATIENT
Start: 2023-10-04

## 2023-10-04 RX ORDER — DOXYCYCLINE HYCLATE 100 MG/1
100 CAPSULE ORAL 2 TIMES DAILY
Qty: 20 CAPSULE | Refills: 0 | Status: SHIPPED | OUTPATIENT
Start: 2023-10-04 | End: 2023-10-14

## 2023-10-04 RX ORDER — METHYLPREDNISOLONE 4 MG/1
TABLET ORAL
Qty: 21 TABLET | Refills: 0 | Status: SHIPPED | OUTPATIENT
Start: 2023-10-04

## 2023-10-04 NOTE — PROGRESS NOTES
Chief Complaint  Nasal Congestion, Cough (x), URI, and Shortness of Breath (Needs refill on inhaler. States has COPD. )    Subjective        Alisha Kurtz presents to [facility]  History of Present Illness    Alisha Kurtz is a 63 y.o. female who presents for a routine visit at this time.  Patient comes in for routine screening visit at this time.  Patient would like to do her routine wellness labs today.  We will go ahead and obtain a CBC comprehensive metabolic panel and a lipid profile for this.  She would like to get set up for colon cancer screening as well as mammography screening at this time.  Patient is also interested in CT lung cancer screening with her history of smoking.  With her routine labs patient is also interested in getting hepatitis C screening at this time.  She would like to update her Pap smear but would like to do this at a future visit in a couple of months.  Reports her last Pap smear was over 7 years ago    Patient was interested in proceeding with her pneumococcal vaccination, flu vaccination but had recently had a tetanus vaccination at a local hospital.  Would like to obtain shingles vaccination but will do this at her local pharmacy.      Patient comes in with a history of upper respiratory congestion, and cough.   Cough is productive with yellow-green sputum.  Patient is also noted wheezing as well as occasional rattles.  Reports no sore throat, fever, nausea, vomiting myalgias associated with this at this time.  Patient reports that this has been going on for 8 days at this point, and would like something to help with her bronshitis at this time.  States she has been improving.    Blood Pressure was significantly elevated when it was first checked today.  Pressure has been more normal in the past we will go ahead and get her set up with the hypertension enrollment plan if it continues to be elevated with numbers that she is sending us we will discuss  "placing her on some medication.    Alisha Kurtz  reports that she has been smoking cigarettes. She has a 30.00 pack-year smoking history. She has never used smokeless tobacco.. I have educated her on the risk of diseases from using tobacco products such as cancer, COPD, and heart disease.     I advised her to quit and she is willing to quit. We have discussed the following method/s for tobacco cessation:  Prescription Medicaiton.  Together we have set a quit date for 2 weeks from today.  She will follow up with me in 1 month or sooner to check on her progress.    I spent 5 minutes counseling the patient.           Objective   Vital Signs:  /100 (BP Location: Right arm, Patient Position: Sitting, Cuff Size: Adult)   Pulse 85   Temp 97.8 °F (36.6 °C) (Skin)   Ht 167.6 cm (66\")   Wt 68.5 kg (151 lb 2 oz)   SpO2 97%   BMI 24.39 kg/m²   Estimated body mass index is 24.39 kg/m² as calculated from the following:    Height as of this encounter: 167.6 cm (66\").    Weight as of this encounter: 68.5 kg (151 lb 2 oz).       BMI is within normal parameters. No other follow-up for BMI required.      Physical Exam  Vitals and nursing note reviewed.   Constitutional:       General: She is not in acute distress.     Appearance: Normal appearance.   HENT:      Head: Normocephalic.   Eyes:      Extraocular Movements: Extraocular movements intact.      Pupils: Pupils are equal, round, and reactive to light.   Cardiovascular:      Rate and Rhythm: Normal rate and regular rhythm.      Heart sounds: Normal heart sounds. No murmur heard.  Pulmonary:      Effort: Pulmonary effort is normal. No respiratory distress.      Breath sounds: Normal breath sounds. No rhonchi or rales.   Abdominal:      General: Abdomen is flat. Bowel sounds are normal.      Palpations: Abdomen is soft.   Neurological:      General: No focal deficit present.      Mental Status: She is alert.      Result Review :                   Assessment and Plan "   Diagnoses and all orders for this visit:    1. Screening for colon cancer (Primary)  -     Ambulatory Referral to Gastroenterology    2. Screening mammogram for breast cancer  -     Mammo Screening Bilateral With CAD; Future    3. Encounter for hepatitis C screening test for low risk patient  -     Hepatitis C antibody; Future    4. Need for pneumococcal vaccination  -     Pneumococcal Conjugate Vaccine 13-Valent All    5. Need for influenza vaccination  -     Fluzone (or Fluarix & Flulaval for VFC) >6 Mos ([id number])    6. Wellness examination  -     CBC & Differential  -     Comprehensive Metabolic Panel  -     Lipid Panel    7. Pulmonary emphysema, unspecified emphysema type    8. Bronchitis  -     albuterol sulfate  (90 Base) MCG/ACT inhaler; Inhale 2 puffs Every 4 (Four) Hours As Needed for Wheezing.  Dispense: 18 g; Refill: 11  -     methylPREDNISolone (MEDROL) 4 MG dose pack; Take as directed on package instructions.  Dispense: 21 tablet; Refill: 0  -     doxycycline (VIBRAMYCIN) 100 MG capsule; Take 1 capsule by mouth 2 (Two) Times a Day for 10 days.  Dispense: 20 capsule; Refill: 0    9. Screening for lung cancer  -      CT Chest Low Dose Cancer Screening WO; Future    10. Tobacco abuse  -     buPROPion XL (Wellbutrin XL) 150 MG 24 hr tablet; Take 1 tablet by mouth Daily.  Dispense: 30 tablet; Refill: 11    11. Reactive depression  -     buPROPion XL (Wellbutrin XL) 150 MG 24 hr tablet; Take 1 tablet by mouth Daily.  Dispense: 30 tablet; Refill: 11      EMR Dictation/Transcription disclaimer:   Some of this note may be an electronic transcription/translation of spoken language to printed text. The electronic translation of spoken language may permit erroneous, or at times, nonsensical words or phrases to be inadvertently transcribed; Although I have reviewed the note for such errors, some may still exist.          Follow Up   No follow-ups on file.  Patient was given instructions and counseling  regarding her condition or for health maintenance advice. Please see specific information pulled into the AVS if appropriate.

## 2023-10-04 NOTE — LETTER
[facility]  Vaccine Consent Form    Patient Name:  Alisha Kurtz  Patient :  1960     Vaccine(s) Ordered    Pneumococcal Conjugate Vaccine 13-Valent All  Fluzone (or Fluarix & Flulaval for VFC) >6 Mos ([id number])        Screening Checklist  The following questions should be completed prior to vaccination. If you answer “yes” to any question, it does not necessarily mean you should not be vaccinated. It just means we may need to clarify or ask more questions. If a question is unclear, please ask your healthcare provider to explain it.    Yes No   Any fever or moderate to severe illness today (mild illness and/or antibiotic treatment are not contraindications)?     Do you have a history of a serious reaction to any previous vaccinations, such as anaphylaxis, encephalopathy within 7 days, Guillain-[place] syndrome within 6 weeks, seizure?     Have you received any live vaccine(s) in the past month (MMR, BLANCO)?     Do you have an anaphylactic allergy to latex (DTaP, DTaP-IPV, Hep A, Hep B, MenB, RV, Td, Tdap), baker’s yeast (Hep B, HPV), or gelatin (BLANCO, MMR)?     Do you have an anaphylactic allergy to neomycin ([place], BLANCO, MMR, IPV, Hep A), polymyxin B (IPV), or streptomycin (IPV)?      Any cancer, leukemia, AIDS, or other immune system disorder? (BLANCO, MMR, RV)     Do you have a parent, brother, or sister with an immune system problem (if immune competence of vaccine recipient clinically verified, can proceed)? (MMR, BLANCO)     Any recent steroid treatments for >2 weeks, chemotherapy, or radiation treatment? (BLANCO, MMR)     Have you received antibody-containing blood transfusions or IVIG in the past 11 months (recommended interval is dependent on product)? (MMR, BLANCO)     Have you taken antiviral drugs (acyclovir, famciclovir, valacyclovir) in the last 24 or 48 hours, respectively (BLANCO)?      Are you pregnant or planning to become pregnant within 1 month? (BLANCO, MMR, HPV, IPV, MenB; For hep B- refer to Engerix-B)      For infants, have you ever been told your child has had intussusception or a medical emergency involving obstruction of the intestine (RV)? If not for an infant, can skip this question.         *Ordering Physician/APC should be consulted if “yes” is checked by the patient or guardian above.      I have received, read, and understand the Vaccine Information Statement (VIS) for each vaccine ordered above.  I have considered my health status as well as the health status of my close contacts.  I have taken the opportunity to discuss my vaccine questions with my health care provider.   I have requested that the ordered vaccine(s) be given to me.  I understand the benefits and risks of the vaccines.  I understand that I should remain in the clinic for 15 minutes after receiving the vaccine(s).  _________________________________________________________  Signature of Patient or Parent/Legal Guardian ____________________  Date

## 2023-10-05 ENCOUNTER — PATIENT OUTREACH (OUTPATIENT)
Dept: CASE MANAGEMENT | Facility: OTHER | Age: 63
End: 2023-10-05
Payer: MEDICAID

## 2023-10-05 LAB
ALBUMIN SERPL-MCNC: 4.3 G/DL (ref 3.9–4.9)
ALBUMIN/GLOB SERPL: 1.4 {RATIO} (ref 1.2–2.2)
ALP SERPL-CCNC: 116 IU/L (ref 44–121)
ALT SERPL-CCNC: 15 IU/L (ref 0–32)
AST SERPL-CCNC: 18 IU/L (ref 0–40)
BASOPHILS # BLD AUTO: 0.1 X10E3/UL (ref 0–0.2)
BASOPHILS NFR BLD AUTO: 1 %
BILIRUB SERPL-MCNC: 0.2 MG/DL (ref 0–1.2)
BUN SERPL-MCNC: 11 MG/DL (ref 8–27)
BUN/CREAT SERPL: 13 (ref 12–28)
CALCIUM SERPL-MCNC: 9.6 MG/DL (ref 8.7–10.3)
CHLORIDE SERPL-SCNC: 101 MMOL/L (ref 96–106)
CHOLEST SERPL-MCNC: 202 MG/DL (ref 100–199)
CO2 SERPL-SCNC: 25 MMOL/L (ref 20–29)
CREAT SERPL-MCNC: 0.84 MG/DL (ref 0.57–1)
EGFRCR SERPLBLD CKD-EPI 2021: 79 ML/MIN/1.73
EOSINOPHIL # BLD AUTO: 0.3 X10E3/UL (ref 0–0.4)
EOSINOPHIL NFR BLD AUTO: 3 %
ERYTHROCYTE [DISTWIDTH] IN BLOOD BY AUTOMATED COUNT: 12.2 % (ref 11.7–15.4)
GLOBULIN SER CALC-MCNC: 3.1 G/DL (ref 1.5–4.5)
GLUCOSE SERPL-MCNC: 80 MG/DL (ref 70–99)
HCT VFR BLD AUTO: 42.6 % (ref 34–46.6)
HDLC SERPL-MCNC: 48 MG/DL
HGB BLD-MCNC: 13.9 G/DL (ref 11.1–15.9)
IMM GRANULOCYTES # BLD AUTO: 0 X10E3/UL (ref 0–0.1)
IMM GRANULOCYTES NFR BLD AUTO: 0 %
LDLC SERPL CALC-MCNC: 137 MG/DL (ref 0–99)
LYMPHOCYTES # BLD AUTO: 2.4 X10E3/UL (ref 0.7–3.1)
LYMPHOCYTES NFR BLD AUTO: 28 %
MCH RBC QN AUTO: 30.5 PG (ref 26.6–33)
MCHC RBC AUTO-ENTMCNC: 32.6 G/DL (ref 31.5–35.7)
MCV RBC AUTO: 94 FL (ref 79–97)
MONOCYTES # BLD AUTO: 0.7 X10E3/UL (ref 0.1–0.9)
MONOCYTES NFR BLD AUTO: 8 %
NEUTROPHILS # BLD AUTO: 5 X10E3/UL (ref 1.4–7)
NEUTROPHILS NFR BLD AUTO: 60 %
PLATELET # BLD AUTO: 319 X10E3/UL (ref 150–450)
POTASSIUM SERPL-SCNC: 4.5 MMOL/L (ref 3.5–5.2)
PROT SERPL-MCNC: 7.4 G/DL (ref 6–8.5)
RBC # BLD AUTO: 4.55 X10E6/UL (ref 3.77–5.28)
SODIUM SERPL-SCNC: 141 MMOL/L (ref 134–144)
TRIGL SERPL-MCNC: 93 MG/DL (ref 0–149)
VLDLC SERPL CALC-MCNC: 17 MG/DL (ref 5–40)
WBC # BLD AUTO: 8.4 X10E3/UL (ref 3.4–10.8)

## 2023-10-05 NOTE — OUTREACH NOTE
AMBULATORY CASE MANAGEMENT NOTE    Name and Relationship of Patient/Support Person: Jerardo Alisha J - Self    Patient Outreach    [employer] Care Companion Health Tracking was activated for patient during an office visit with Primary Care, 10/4/23.  Per review of record and conversation with patient, she has not logged any BP readings nor accessed the education modules.  Patient stated she has a blood pressure cuff, and is monitoring her BP every day.  She said it has been running high; she voiced compliance with daily medications. Confirmed with patient, where on [employer] to enter her BP readings.   RN-ACM contact information provided for any questions.  Provided the [employer] Patient Support Line number also.  Patient verbalized understanding.    Adult Patient Profile  Questions/Answers      Flowsheet Row Most Recent Value   Symptoms/Conditions Managed at Home cardiovascular   Cardiovascular Symptoms/Conditions hypertension   Cardiovascular Management Strategies medication therapy, routine screening   Source of Information patient, health record          [employer] Hypertension Care Companion Enrollment    Date of enrollment order: 10/4/23  Ordering provider: Lux Umanzor MD  Enrolling provider: Lux Umanzor MD  Patient has/had own BP monitoring equipment?: yes       Education Documentation  Medication Management, taught by Eva Villegas, RN at 10/5/2023  1:38 PM.  Learner: Patient  Readiness: Acceptance  Method: Explanation  Response: Verbalizes Understanding    Blood Pressure Monitoring, taught by Eva Villegas, RN at 10/5/2023  1:38 PM.  Learner: Patient  Readiness: Acceptance  Method: Explanation  Response: Verbalizes Understanding          Eva SPAULDING  Ambulatory Case Management    10/5/2023, 13:38 CDT

## 2023-11-20 ENCOUNTER — TELEPHONE (OUTPATIENT)
Dept: CASE MANAGEMENT | Facility: OTHER | Age: 63
End: 2023-11-20
Payer: MEDICAID

## 2023-11-20 ENCOUNTER — PATIENT OUTREACH (OUTPATIENT)
Dept: CASE MANAGEMENT | Facility: OTHER | Age: 63
End: 2023-11-20
Payer: MEDICAID

## 2023-11-20 NOTE — TELEPHONE ENCOUNTER
I just wanted Dr. Umanzor to be aware of this patient's Blood Pressures, noted on [facility] Care Companion program for HTN.  I thought Dr. Umanzor would want to know.    In talking with the patient today, she said she is not having any symptoms.  She denied any HA, no chest pain or palpitations.  Said she is a little short of breath all the time due to COPD.  Patient stated she is not on any BP medications; stated she is probably under a lot of stress. Advised patient to monitor her BP every day and record in the [facility] BP program. She said she would try to do this.              I see her next appt with Dr. Umanzor is on 1/2/24.      Thank you,  Eva YATES, RN-ACM  Ambulatory

## 2023-11-20 NOTE — TELEPHONE ENCOUNTER
Tried to call, na.  Would like to add some bp meds to get better controlled a small dose of hctz or lisinoprikl

## 2023-11-20 NOTE — OUTREACH NOTE
AMBULATORY CASE MANAGEMENT NOTE    Name and Relationship of Patient/Support Person: Alisha Kurtz - Self  Self    Patient Outreach    RN-JACEYM reviewed the [facility] HTN program for patient's BP entries.  Noted some recent elevated BP entries.   RN-ACM called patient to follow up on HTN management.  Patient voiced awareness of elevated BP readings.  She stated she is not having chest pain or palpitations, is not having headaches.  Said she is a little short of breath all the time due to COPD. She stated she is feeling okay.  Discussed HTN education.  Patient stated she does not take any BP medication; said she is probably  under a lot of stress.  Encouraged patient to monitor her BP every day and enter the reading into her [facility] BP program, so Dr. Umanzor can see how she is doing at home on a regular basis. Patient voiced agreement.     Care Coordination    A telephone encounter was placed and routed to the PCP clinical pool regarding patient's blood pressure readings in the [facility] Blood Pressure program.     Eva SPAULDING  Ambulatory Case Management    11/20/2023, 16:31 CST

## 2023-11-21 DIAGNOSIS — I10 PRIMARY HYPERTENSION: Primary | ICD-10-CM

## 2023-11-21 RX ORDER — LISINOPRIL 10 MG/1
10 TABLET ORAL DAILY
Qty: 30 TABLET | Refills: 11 | Status: SHIPPED | OUTPATIENT
Start: 2023-11-21

## 2023-11-27 ENCOUNTER — PATIENT OUTREACH (OUTPATIENT)
Dept: CASE MANAGEMENT | Facility: OTHER | Age: 63
End: 2023-11-27
Payer: MEDICAID

## 2023-11-27 NOTE — OUTREACH NOTE
AMBULATORY CASE MANAGEMENT NOTE    Name and Relationship of Patient/Support Person: Alisha Kurtz - Self  Self    Patient Outreach    RN-LANDRY reviewed [facility] HTN program for patient's BP entries.  Last BP entered into the [employer] BP program was 11/20/23.  In chart review, noted patient was ordered a blood pressure medication, on 11/21/23.  RN-LANDRY called patient to follow up on HTN management.  Patient said she has been taking her new BP medication every day in the evenings after work, around 8:30pm; she checks her BP in the mornings.  She said she has continued to monitor her BP every day and has it written down, she just has not gotten this last week's of BP's into [employer] BP program yet.  Patient reported her BP yesterday was around 126/88.  She said her BP's have improved since starting on the medication.  Encouraged patient to take the time to enter her BP's daily into the [employer] BP program.  She said she would try to do this.  No questions or concerns voiced at this time.    Education Documentation  Self-Care, taught by Eva Villegas, FLAKITO at 11/27/2023  2:46 PM.  Learner: Patient  Readiness: Acceptance  Method: Explanation  Response: Verbalizes Understanding    Medication Management, taught by Eva Villegas RN at 11/27/2023  2:46 PM.  Learner: Patient  Readiness: Acceptance  Method: Explanation  Response: Verbalizes Understanding    Blood Pressure Monitoring, taught by Eva Villegas, FLAKITO at 11/27/2023  2:46 PM.  Learner: Patient  Readiness: Acceptance  Method: Explanation  Response: Verbalizes Understanding          Eva SPAULDING  Ambulatory Case Management    11/27/2023, 14:47 CST

## 2023-11-29 ENCOUNTER — TELEPHONE (OUTPATIENT)
Dept: CASE MANAGEMENT | Facility: OTHER | Age: 63
End: 2023-11-29
Payer: MEDICAID

## 2023-11-29 ENCOUNTER — PATIENT OUTREACH (OUTPATIENT)
Dept: CASE MANAGEMENT | Facility: OTHER | Age: 63
End: 2023-11-29
Payer: MEDICAID

## 2023-11-29 NOTE — OUTREACH NOTE
AMBULATORY CASE MANAGEMENT NOTE    Name and Relationship of Patient/Support Person: PCP, Dr. HERBERT Umanzor -     Care Coordination    A telephone encounter was placed and routed to the PCP clinical pool regarding patient's blood pressures in the [facility] Blood Pressure program.     Eva SPAULDING  Ambulatory Case Management    11/29/2023, 08:19 CST

## 2023-11-29 NOTE — TELEPHONE ENCOUNTER
I just wanted Dr. Umanzor to be aware of this patient's Blood Pressures the last week, since starting on blood pressure medication ordered on 11/21/23, noted on [facility] Companion program for HTN.  I thought Dr. Umanzor would be pleased to see this.            Her next appt with Dr. Umanzor is on 1/2/24.      Thank you,  Eva YATES RN-ACM  Ambulatory

## 2023-12-03 LAB
NCCN CRITERIA FLAG: NORMAL
TYRER CUZICK SCORE: 7.5

## 2023-12-18 ENCOUNTER — OFFICE VISIT (OUTPATIENT)
Dept: GASTROENTEROLOGY | Facility: CLINIC | Age: 63
End: 2023-12-18
Payer: MEDICAID

## 2023-12-18 VITALS
WEIGHT: 161 LBS | HEIGHT: 66 IN | TEMPERATURE: 96.9 F | HEART RATE: 69 BPM | SYSTOLIC BLOOD PRESSURE: 152 MMHG | DIASTOLIC BLOOD PRESSURE: 90 MMHG | BODY MASS INDEX: 25.88 KG/M2 | OXYGEN SATURATION: 100 %

## 2023-12-18 DIAGNOSIS — Z83.719 FAMILY HX COLONIC POLYPS: ICD-10-CM

## 2023-12-18 DIAGNOSIS — Z12.11 ENCOUNTER FOR SCREENING FOR MALIGNANT NEOPLASM OF COLON: Primary | ICD-10-CM

## 2023-12-18 NOTE — PROGRESS NOTES
[facility]    Primary Physician Lux Umanzor MD    2023    Alisha Kurtz   1960      Chief Complaint   Patient presents with    Colonoscopy       Subjective     HPI    Alisha Kurtz is a 63 y.o. female who presents as a referral for preventative maintenance. She has no complaints of nausea or vomiting. No change in bowels. No wt loss. No BRBPR. No melena. No abdominal pain.         Last colonoscopy was close to 10 years ago. No colon polyps or colon cancer.    Father had colon polyps.   No family history of colon cancer.       Past Medical History:   Diagnosis Date    Hypertension        Past Surgical History:   Procedure Laterality Date     SECTION      KNEE ARTHROSCOPY      LAPAROSCOPIC LYSIS INTESTINAL ADHESIONS         Outpatient Medications Marked as Taking for the 23 encounter (Office Visit) with Florence Crandall APRN   Medication Sig Dispense Refill    albuterol sulfate  (90 Base) MCG/ACT inhaler Inhale 2 puffs Every 4 (Four) Hours As Needed for Wheezing. 18 g 11    buPROPion XL (Wellbutrin XL) 150 MG 24 hr tablet Take 1 tablet by mouth Daily. 30 tablet 11    lisinopril (PRINIVIL,ZESTRIL) 10 MG tablet Take 1 tablet by mouth Daily. 30 tablet 11       Allergies   Allergen Reactions    Ceclor [Cefaclor] Hives    Hydrocodone-Ibuprofen Unknown - Low Severity       Social History     Socioeconomic History    Marital status:    Tobacco Use    Smoking status: Every Day     Packs/day: 1.00     Years: 30.00     Additional pack years: 0.00     Total pack years: 30.00     Types: Cigarettes    Smokeless tobacco: Never   Vaping Use    Vaping Use: Some days   Substance and Sexual Activity    Alcohol use: Yes     Comment: rare    Drug use: Never    Sexual activity: Defer       Family History   Problem Relation Age of Onset    Stroke Mother     Colon polyps Father     Heart disease Father     Colon cancer Neg Hx        Review of Systems    Constitutional:  Negative for chills, fever and unexpected weight change.   Respiratory:  Negative for shortness of breath.    Cardiovascular:  Negative for chest pain.   Gastrointestinal:  Negative for abdominal distention, abdominal pain, anal bleeding, blood in stool, constipation, diarrhea, nausea and vomiting.       Objective     Vitals:    12/18/23 1224   BP: 152/90   Pulse: 69   Temp: 96.9 °F (36.1 °C)   SpO2: 100%         12/18/23  1224   Weight: 73 kg (161 lb)     Body mass index is 25.99 kg/m².    Physical Exam  Vitals reviewed.   Constitutional:       General: She is not in acute distress.  Cardiovascular:      Rate and Rhythm: Normal rate and regular rhythm.      Heart sounds: Normal heart sounds.   Pulmonary:      Effort: Pulmonary effort is normal.      Breath sounds: Normal breath sounds.   Abdominal:      General: Bowel sounds are normal. There is no distension.      Palpations: Abdomen is soft.      Tenderness: There is no abdominal tenderness.   Skin:     General: Skin is warm and dry.   Neurological:      Mental Status: She is alert.         Imaging Results (Most Recent)       None            Assessment & Plan     Diagnoses and all orders for this visit:    1. Encounter for screening for malignant neoplasm of colon (Primary)  -     Case Request; Standing    2. Family hx colonic polyps    Other orders  -     Implement Anesthesia Orders Day of Procedure; Standing  -     Obtain Informed Consent; Standing    Schedule colonoscopy.  Miralax prep.                 COLONOSCOPY WITH ANESTHESIA (N/A)  All risks, benefits, alternatives, and indications of colonoscopy procedure have been discussed with the patient. Risks to include perforation of the colon requiring possible surgery or colostomy, risk of bleeding from biopsies or removal of colon tissue, possibility of missing a colon polyp or cancer, or adverse drug reaction.  Benefits to include the diagnosis and management of disease of the colon and  rectum. Alternatives to include barium enema, radiographic evaluation, lab testing or no intervention. Pt verbalizes understanding and agrees.     There are no Patient Instructions on file for this visit.    Florence Crandall, APRN

## 2023-12-26 ENCOUNTER — TELEPHONE (OUTPATIENT)
Dept: INTERNAL MEDICINE | Facility: CLINIC | Age: 63
End: 2023-12-26

## 2023-12-26 NOTE — TELEPHONE ENCOUNTER
HUB okay to tell or read pt this info:  Tried to call pt,  no voicemail box,   if pt calls back  okay to reschedule until after her scan.

## 2023-12-26 NOTE — TELEPHONE ENCOUNTER
Caller: Alisha Kurtz    Relationship: Self    Best call back number: [phone]     What is the best time to reach you: MID MORNING-2PM    Who are you requesting to speak with (clinical staff, provider,  specific staff member): CLINICAL    What was the call regarding: PATIENT ASKED IF SHE NEEDS TO KEEP HER APPOINTMENT ON 1.2.24 BECAUSE HER LUNG SCAN IS NOT UNTIL THE END OF JANUARY AND WANTS TO KNOW IF SHE NEEDS TO RESCHEDULE HER APPOINTMENT WITH DR. GANT?    Is it okay if the provider responds through [employer]: NO, PLEASE CALL

## 2023-12-28 NOTE — TELEPHONE ENCOUNTER
Got ahold of patient,  have her rescheduled to come in after her tests so Dr Umanzor will have results.

## 2024-01-02 ENCOUNTER — TELEPHONE (OUTPATIENT)
Dept: CASE MANAGEMENT | Facility: OTHER | Age: 64
End: 2024-01-02
Payer: MEDICAID

## 2024-01-02 ENCOUNTER — HOSPITAL ENCOUNTER (OUTPATIENT)
Dept: CT IMAGING | Facility: HOSPITAL | Age: 64
Discharge: HOME | End: 2024-01-02
Payer: MEDICAID

## 2024-01-02 ENCOUNTER — HOSPITAL ENCOUNTER (OUTPATIENT)
Dept: MAMMOGRAPHY | Facility: HOSPITAL | Age: 64
Discharge: HOME | End: 2024-01-02
Payer: MEDICAID

## 2024-01-02 ENCOUNTER — PATIENT OUTREACH (OUTPATIENT)
Dept: CASE MANAGEMENT | Facility: OTHER | Age: 64
End: 2024-01-02
Payer: MEDICAID

## 2024-01-02 DIAGNOSIS — Z12.2 SCREENING FOR LUNG CANCER: ICD-10-CM

## 2024-01-02 DIAGNOSIS — Z12.31 SCREENING MAMMOGRAM FOR BREAST CANCER: ICD-10-CM

## 2024-01-02 PROCEDURE — 71271 CT THORAX LUNG CANCER SCR C-: CPT

## 2024-01-02 PROCEDURE — 77067 SCR MAMMO BI INCL CAD: CPT

## 2024-01-02 PROCEDURE — 77063 BREAST TOMOSYNTHESIS BI: CPT

## 2024-01-02 NOTE — OUTREACH NOTE
AMBULATORY CASE MANAGEMENT NOTE    Name and Relationship of Patient/Support Person: PCP, Dr. Umanzor's office -     Care Coordination    A telephone encounter was placed and routed to the PCP clinical pool regarding the auto-completion of the [facility] Blood Pressure program.     [facility] Hypertension Care Companion Enrollment    Completed program: successful  Program completed date: 1/2/2024  3:31 PM       Eva SPAULDING  Ambulatory Case Management    1/2/2024, 15:33 CST

## 2024-01-02 NOTE — TELEPHONE ENCOUNTER
Patient was enrolled in the [facility] Companion HTN Program by Dr. Umanzor, on 10/4/23.  The program has auto-completed on 1/1/24.  Dr. Umanzor can pull up the whole set of BP readings in the patient's chart to review them as he needs to.  Please call me for any questions about being able to see the blood pressures.      The patient was aware of its auto-completion on 1/1/24, and in last conversation she said that she was still monitoring her BP's, though not every single day.  She voiced no concerns or issues.     I just wanted Dr. Umanzor to be aware of the [facility] BP Program's completion.    I see that patient has her next appt with Dr. Umanzor on 1/8/24.     Thank you,     FLAKITO Velasquez  Ambulatory   [phone]

## 2024-01-04 DIAGNOSIS — R91.1 LUNG NODULE: Primary | ICD-10-CM

## 2024-01-08 ENCOUNTER — OFFICE VISIT (OUTPATIENT)
Dept: INTERNAL MEDICINE | Facility: CLINIC | Age: 64
End: 2024-01-08
Payer: MEDICAID

## 2024-01-08 VITALS
WEIGHT: 158 LBS | HEIGHT: 66 IN | DIASTOLIC BLOOD PRESSURE: 60 MMHG | BODY MASS INDEX: 25.39 KG/M2 | HEART RATE: 107 BPM | SYSTOLIC BLOOD PRESSURE: 116 MMHG | OXYGEN SATURATION: 96 % | TEMPERATURE: 94 F

## 2024-01-08 DIAGNOSIS — Z72.0 TOBACCO ABUSE: ICD-10-CM

## 2024-01-08 DIAGNOSIS — F33.1 MODERATE EPISODE OF RECURRENT MAJOR DEPRESSIVE DISORDER: ICD-10-CM

## 2024-01-08 RX ORDER — FLUTICASONE PROPIONATE 50 MCG
2 SPRAY, SUSPENSION (ML) NASAL DAILY
Qty: 16 G | Refills: 11 | Status: SHIPPED | OUTPATIENT
Start: 2024-01-08

## 2024-01-08 NOTE — PROGRESS NOTES
Subjective     Chief Complaint   Patient presents with    Annual Exam    Depression       Depression      Patient's PMR from outside medical facility reviewed and noted.    Alisha Kurtz is a 63 y.o. female who presents for a routine visit at this time.  We discussed this patient's mammogram as well as her CT.  Will be setting her up for a full for 3-month follow-up of those films very shortly.  Patient main concern today is her anxiety and depression.  Her anxiety and depression have not been as well-controlled as she would like she states that the Wellbutrin helps but not enough to get her all the way through the day.  She would like something additional to help with the Wellbutrin to maintain better control of her anxiety and depression.  Will go ahead and start her on some Vraylar to act as an adjunct of therapy.  Will follow-up with her in 1 month and see how she does with this treatment    Review of systems   negative unless otherwise specified above in HPI    Past Medical History:   Past Medical History:   Diagnosis Date    Hypertension      Past Surgical History:  Past Surgical History:   Procedure Laterality Date     SECTION      KNEE ARTHROSCOPY      LAPAROSCOPIC LYSIS INTESTINAL ADHESIONS       Social History:  reports that she has been smoking cigarettes. She has a 30.00 pack-year smoking history. She has never used smokeless tobacco. She reports current alcohol use. She reports that she does not use drugs.    Family History: family history includes Breast cancer in her maternal grandmother and paternal aunt; Colon polyps in her father; Heart disease in her father; Stroke in her mother.      Allergies:  Allergies   Allergen Reactions    Ceclor [Cefaclor] Hives    Hydrocodone-Ibuprofen Unknown - Low Severity     Medications:  Prior to Admission medications    Medication Sig Start Date End Date Taking? Authorizing Provider   albuterol sulfate  (90 Base) MCG/ACT inhaler Inhale 2 puffs  Every 4 (Four) Hours As Needed for Wheezing. 10/4/23  Yes Lux Umanzor MD   buPROPion XL (Wellbutrin XL) 150 MG 24 hr tablet Take 1 tablet by mouth Daily. 10/4/23  Yes Lux Umanzor MD   lisinopril (PRINIVIL,ZESTRIL) 10 MG tablet Take 1 tablet by mouth Daily. 11/21/23  Yes Lux Umanzor MD   methylPREDNISolone (MEDROL) 4 MG dose pack Take as directed on package instructions. 10/4/23 1/8/24  Lux Umanzor MD       HELEN: Over the last two weeks, how often have you been bothered by the following problems?  Feeling nervous, anxious or on edge: Nearly every day  Not being able to stop or control worrying: Nearly every day  Worrying too much about different things: Nearly every day  Trouble Relaxing: Not at all  Being so restless that it is hard to sit still: Nearly every day  Becoming easily annoyed or irritable: Nearly every day  Feeling afraid as if something awful might happen: Nearly every day  HELEN 7 Total Score: 18  If you checked any problems, how difficult have these problems made it for you to do your work, take care of things at home, or get along with other people: Very difficult      PHQ-9 Depression Screening  Little interest or pleasure in doing things? 1-->several days   Feeling down, depressed, or hopeless? 1-->several days   Trouble falling or staying asleep, or sleeping too much? 1-->several days   Feeling tired or having little energy? 3-->nearly every day   Poor appetite or overeating? 1-->several days   Feeling bad about yourself - or that you are a failure or have let yourself or your family down? 0-->not at all   Trouble concentrating on things, such as reading the newspaper or watching television? 3-->nearly every day   Moving or speaking so slowly that other people could have noticed? Or the opposite - being so fidgety or restless that you have been moving around a lot more than usual? 1-->several days   Thoughts that you would be better off dead, or  "of hurting yourself in some way? 0-->not at all   PHQ-9 Total Score 11   If you checked off any problems, how difficult have these problems made it for you to do your work, take care of things at home, or get along with other people? very difficult       PHQ-9 Total Score: 11   10-14 (Moderate Depression)  Augment therapy by adding vraylar    Objective     Vital Signs: /60 (BP Location: Right arm, Patient Position: Sitting, Cuff Size: Adult)   Pulse 107   Temp 94 °F (34.4 °C) (Infrared)   Ht 167.6 cm (66\")   Wt 71.7 kg (158 lb)   SpO2 96%   BMI 25.50 kg/m²   Physical Exam  Vitals and nursing note reviewed.   Constitutional:       General: She is not in acute distress.     Appearance: Normal appearance.   HENT:      Head: Normocephalic.   Eyes:      Extraocular Movements: Extraocular movements intact.      Pupils: Pupils are equal, round, and reactive to light.   Cardiovascular:      Rate and Rhythm: Normal rate and regular rhythm.      Heart sounds: Normal heart sounds. No murmur heard.  Pulmonary:      Effort: Pulmonary effort is normal. No respiratory distress.      Breath sounds: Normal breath sounds. No rhonchi or rales.   Abdominal:      General: Abdomen is flat. Bowel sounds are normal.      Palpations: Abdomen is soft.   Neurological:      General: No focal deficit present.      Mental Status: She is alert.                      Results Reviewed:  Glucose   Date Value Ref Range Status   10/04/2023 80 70 - 99 mg/dL Final     BUN   Date Value Ref Range Status   10/04/2023 11 8 - 27 mg/dL Final     Creatinine   Date Value Ref Range Status   10/04/2023 0.84 0.57 - 1.00 mg/dL Final     Sodium   Date Value Ref Range Status   10/04/2023 141 134 - 144 mmol/L Final     Potassium   Date Value Ref Range Status   10/04/2023 4.5 3.5 - 5.2 mmol/L Final     Chloride   Date Value Ref Range Status   10/04/2023 101 96 - 106 mmol/L Final     Total CO2   Date Value Ref Range Status   10/04/2023 25 20 - 29 mmol/L Final "     Calcium   Date Value Ref Range Status   10/04/2023 9.6 8.7 - 10.3 mg/dL Final     ALT (SGPT)   Date Value Ref Range Status   10/04/2023 15 0 - 32 IU/L Final   10/11/2018 12 5 - 33 U/L Final     AST (SGOT)   Date Value Ref Range Status   10/04/2023 18 0 - 40 IU/L Final   10/11/2018 18 5 - 32 U/L Final     WBC   Date Value Ref Range Status   10/04/2023 8.4 3.4 - 10.8 x10E3/uL Final     Hematocrit   Date Value Ref Range Status   10/04/2023 42.6 34.0 - 46.6 % Final     Platelets   Date Value Ref Range Status   10/04/2023 319 150 - 450 x10E3/uL Final     Triglycerides   Date Value Ref Range Status   10/04/2023 93 0 - 149 mg/dL Final   10/11/2018 80 0 - 149 mg/dL Final     HDL Cholesterol   Date Value Ref Range Status   10/04/2023 48 >39 mg/dL Final   10/11/2018 49 (L) 65 - 121 mg/dL Final     Comment:     VALUES>60 MG/DL ARE ASSOCIATED WITH A DECREASED RISK OF  ATHEROSCLEROTIC CARDIOVASCULAR DISEASE     LDL Cholesterol    Date Value Ref Range Status   10/11/2018 124 <100 mg/dL Final     Comment:     <100 MG/DL=OPITIMAL    100-129 MG/DL=DESIRABLE    130-159 MG/DL BORDERLINE=INCREASED RISK OF ATHEROSCLEROTIC  CARDIOVASCULAR DISEASE    > OR = 160 MG/DL=ASSOCIATED WITH AN INCREASE RISK OF  ATHEROSCLEROTIC CARDIOVASCULAR DISEASE     LDL Chol Calc ([facility])   Date Value Ref Range Status   10/04/2023 137 (H) 0 - 99 mg/dL Final     The following data was reviewed by: Lux Umanzor MD on 01/08/2024:    Data reviewed : Radiologic studies ct chest and mamogram    Assessment / Plan     Assessment/Plan:   Diagnosis Plan   1. Tobacco abuse  fluticasone (FLONASE) 50 MCG/ACT nasal spray      2. Moderate episode of recurrent major depressive disorder  Cariprazine HCl (Vraylar) 1.5 MG capsule capsule              Return in about 4 weeks (around 2/5/2024). unless patient needs to be seen sooner or acute issues arise.      I have discussed the patient results/orders and and plan/recommendation with them at today's visit.       Signed by:    Lux Umanzor MD Date: 01/08/24

## 2024-01-18 ENCOUNTER — TELEPHONE (OUTPATIENT)
Dept: INTERNAL MEDICINE | Facility: CLINIC | Age: 64
End: 2024-01-18

## 2024-01-22 ENCOUNTER — HOSPITAL ENCOUNTER (OUTPATIENT)
Dept: MAMMOGRAPHY | Facility: HOSPITAL | Age: 64
Discharge: HOME | End: 2024-01-22
Payer: MEDICAID

## 2024-01-22 ENCOUNTER — HOSPITAL ENCOUNTER (OUTPATIENT)
Dept: ULTRASOUND IMAGING | Facility: HOSPITAL | Age: 64
Discharge: HOME | End: 2024-01-22
Payer: MEDICAID

## 2024-01-22 DIAGNOSIS — R92.8 ABNORMAL MAMMOGRAM: ICD-10-CM

## 2024-01-22 PROCEDURE — G0279 TOMOSYNTHESIS, MAMMO: HCPCS

## 2024-01-22 PROCEDURE — 76642 ULTRASOUND BREAST LIMITED: CPT

## 2024-01-22 PROCEDURE — 77065 DX MAMMO INCL CAD UNI: CPT

## 2024-02-05 ENCOUNTER — OFFICE VISIT (OUTPATIENT)
Dept: INTERNAL MEDICINE | Facility: CLINIC | Age: 64
End: 2024-02-05
Payer: MEDICAID

## 2024-02-05 VITALS
HEART RATE: 88 BPM | BODY MASS INDEX: 25.39 KG/M2 | WEIGHT: 158 LBS | DIASTOLIC BLOOD PRESSURE: 83 MMHG | TEMPERATURE: 98 F | SYSTOLIC BLOOD PRESSURE: 146 MMHG | OXYGEN SATURATION: 94 % | HEIGHT: 66 IN

## 2024-02-05 DIAGNOSIS — F32.9 REACTIVE DEPRESSION: ICD-10-CM

## 2024-02-05 DIAGNOSIS — I10 PRIMARY HYPERTENSION: Primary | ICD-10-CM

## 2024-02-05 PROCEDURE — 99213 OFFICE O/P EST LOW 20 MIN: CPT | Performed by: FAMILY MEDICINE

## 2024-02-05 PROCEDURE — 1159F MED LIST DOCD IN RCRD: CPT | Performed by: FAMILY MEDICINE

## 2024-02-05 PROCEDURE — 1160F RVW MEDS BY RX/DR IN RCRD: CPT | Performed by: FAMILY MEDICINE

## 2024-02-05 RX ORDER — HYDROCHLOROTHIAZIDE 12.5 MG/1
12.5 TABLET ORAL DAILY
Qty: 30 TABLET | Refills: 11 | Status: SHIPPED | OUTPATIENT
Start: 2024-02-05

## 2024-02-05 NOTE — PROGRESS NOTES
Subjective     Chief Complaint   Patient presents with    Follow-up     meds       History of Present Illness    Patient's PMR from outside medical facility reviewed and noted.    Alisha Kurtz is a 63 y.o. female who presents for a routine visit at this time.  Comes in for recheck on her Vraylar for her reactive depression.  Patient states the medication continues to work fairly well for her and she is happy with her current dose.  Will go ahead and continue on the current dose and recheck in approximately 3 months.  Patient also had developed a chronic dry hacking cough.  Patient would like something to help with this at this time we will go ahead and discontinue her lisinopril and change it to hydrochlorothiazide as I believe this to be a side effect of the medication.    negative unless otherwise specified above in HPI    Past Medical History:   Past Medical History:   Diagnosis Date    Hypertension      Past Surgical History:  Past Surgical History:   Procedure Laterality Date     SECTION      KNEE ARTHROSCOPY      LAPAROSCOPIC LYSIS INTESTINAL ADHESIONS       Social History:  reports that she has been smoking cigarettes. She has a 30.00 pack-year smoking history. She has never used smokeless tobacco. She reports current alcohol use. She reports that she does not use drugs.    Family History: family history includes Breast cancer in her maternal grandmother and paternal aunt; Colon polyps in her father; Heart disease in her father; Stroke in her mother.      Allergies:  Allergies   Allergen Reactions    Ceclor [Cefaclor] Hives    Hydrocodone-Ibuprofen Unknown - Low Severity     Medications:  Prior to Admission medications    Medication Sig Start Date End Date Taking? Authorizing Provider   albuterol sulfate  (90 Base) MCG/ACT inhaler Inhale 2 puffs Every 4 (Four) Hours As Needed for Wheezing. 10/4/23  Yes Lux Umanzor MD   buPROPion XL (Wellbutrin XL) 150 MG 24 hr tablet Take  "1 tablet by mouth Daily. 10/4/23  Yes Lux Umanzor MD   lisinopril (PRINIVIL,ZESTRIL) 10 MG tablet Take 1 tablet by mouth Daily. 11/21/23  Yes Lux Umanzor MD   methylPREDNISolone (MEDROL) 4 MG dose pack Take as directed on package instructions. 10/4/23 1/8/24  Lux Umanzor MD       HELEN:        PHQ-9 Depression Screening  Little interest or pleasure in doing things?     Feeling down, depressed, or hopeless?     Trouble falling or staying asleep, or sleeping too much?     Feeling tired or having little energy?     Poor appetite or overeating?     Feeling bad about yourself - or that you are a failure or have let yourself or your family down?     Trouble concentrating on things, such as reading the newspaper or watching television?     Moving or speaking so slowly that other people could have noticed? Or the opposite - being so fidgety or restless that you have been moving around a lot more than usual?     Thoughts that you would be better off dead, or of hurting yourself in some way?     PHQ-9 Total Score     If you checked off any problems, how difficult have these problems made it for you to do your work, take care of things at home, or get along with other people?         PHQ-9 Total Score:     10-14 (Moderate Depression)  Augment therapy by adding vraylar    Objective     Vital Signs: /83 (BP Location: Left arm, Patient Position: Sitting, Cuff Size: Adult)   Pulse 88   Temp 98 °F (36.7 °C) (Skin)   Ht 167.6 cm (66\")   Wt 71.7 kg (158 lb)   SpO2 94%   BMI 25.50 kg/m²   Physical Exam  Vitals and nursing note reviewed.   Constitutional:       General: She is not in acute distress.     Appearance: Normal appearance.   HENT:      Head: Normocephalic.   Eyes:      Extraocular Movements: Extraocular movements intact.      Pupils: Pupils are equal, round, and reactive to light.   Cardiovascular:      Rate and Rhythm: Normal rate and regular rhythm.      Heart sounds: " Normal heart sounds. No murmur heard.  Pulmonary:      Effort: Pulmonary effort is normal. No respiratory distress.      Breath sounds: Normal breath sounds. No rhonchi or rales.   Abdominal:      General: Abdomen is flat. Bowel sounds are normal.      Palpations: Abdomen is soft.   Neurological:      General: No focal deficit present.      Mental Status: She is alert.                      Results Reviewed:  Glucose   Date Value Ref Range Status   10/04/2023 80 70 - 99 mg/dL Final     BUN   Date Value Ref Range Status   10/04/2023 11 8 - 27 mg/dL Final     Creatinine   Date Value Ref Range Status   10/04/2023 0.84 0.57 - 1.00 mg/dL Final     Sodium   Date Value Ref Range Status   10/04/2023 141 134 - 144 mmol/L Final     Potassium   Date Value Ref Range Status   10/04/2023 4.5 3.5 - 5.2 mmol/L Final     Chloride   Date Value Ref Range Status   10/04/2023 101 96 - 106 mmol/L Final     Total CO2   Date Value Ref Range Status   10/04/2023 25 20 - 29 mmol/L Final     Calcium   Date Value Ref Range Status   10/04/2023 9.6 8.7 - 10.3 mg/dL Final     ALT (SGPT)   Date Value Ref Range Status   10/04/2023 15 0 - 32 IU/L Final   10/11/2018 12 5 - 33 U/L Final     AST (SGOT)   Date Value Ref Range Status   10/04/2023 18 0 - 40 IU/L Final   10/11/2018 18 5 - 32 U/L Final     WBC   Date Value Ref Range Status   10/04/2023 8.4 3.4 - 10.8 x10E3/uL Final     Hematocrit   Date Value Ref Range Status   10/04/2023 42.6 34.0 - 46.6 % Final     Platelets   Date Value Ref Range Status   10/04/2023 319 150 - 450 x10E3/uL Final     Triglycerides   Date Value Ref Range Status   10/04/2023 93 0 - 149 mg/dL Final   10/11/2018 80 0 - 149 mg/dL Final     HDL Cholesterol   Date Value Ref Range Status   10/04/2023 48 >39 mg/dL Final   10/11/2018 49 (L) 65 - 121 mg/dL Final     Comment:     VALUES>60 MG/DL ARE ASSOCIATED WITH A DECREASED RISK OF  ATHEROSCLEROTIC CARDIOVASCULAR DISEASE     LDL Cholesterol    Date Value Ref Range Status   10/11/2018  124 <100 mg/dL Final     Comment:     <100 MG/DL=OPITIMAL    100-129 MG/DL=DESIRABLE    130-159 MG/DL BORDERLINE=INCREASED RISK OF ATHEROSCLEROTIC  CARDIOVASCULAR DISEASE    > OR = 160 MG/DL=ASSOCIATED WITH AN INCREASE RISK OF  ATHEROSCLEROTIC CARDIOVASCULAR DISEASE     LDL Chol Calc ([facility])   Date Value Ref Range Status   10/04/2023 137 (H) 0 - 99 mg/dL Final     The following data was reviewed by: Lux Umanzor MD on 01/08/2024:    Data reviewed : Radiologic studies ct chest and mamogram    Assessment / Plan     Assessment/Plan:   Diagnosis Plan   1. Primary hypertension  hydroCHLOROthiazide (HYDRODIURIL) 12.5 MG tablet      2. Reactive depression                  Return in about 3 months (around 5/5/2024). unless patient needs to be seen sooner or acute issues arise.      I have discussed the patient results/orders and and plan/recommendation with them at today's visit.      Signed by:    Lux Umanzor MD Date: 02/05/24

## 2024-02-26 ENCOUNTER — ANESTHESIA (OUTPATIENT)
Dept: GASTROENTEROLOGY | Facility: HOSPITAL | Age: 64
End: 2024-02-26
Payer: MEDICAID

## 2024-02-26 ENCOUNTER — ANESTHESIA EVENT (OUTPATIENT)
Dept: GASTROENTEROLOGY | Facility: HOSPITAL | Age: 64
End: 2024-02-26
Payer: MEDICAID

## 2024-02-26 ENCOUNTER — HOSPITAL ENCOUNTER (OUTPATIENT)
Facility: HOSPITAL | Age: 64
Setting detail: HOSPITAL OUTPATIENT SURGERY
Discharge: HOME | End: 2024-02-26
Attending: INTERNAL MEDICINE | Admitting: INTERNAL MEDICINE
Payer: MEDICAID

## 2024-02-26 VITALS
HEIGHT: 66 IN | HEART RATE: 83 BPM | TEMPERATURE: 96.9 F | OXYGEN SATURATION: 97 % | SYSTOLIC BLOOD PRESSURE: 104 MMHG | RESPIRATION RATE: 17 BRPM | WEIGHT: 160 LBS | DIASTOLIC BLOOD PRESSURE: 74 MMHG | BODY MASS INDEX: 25.71 KG/M2

## 2024-02-26 DIAGNOSIS — Z12.11 ENCOUNTER FOR SCREENING FOR MALIGNANT NEOPLASM OF COLON: ICD-10-CM

## 2024-02-26 PROCEDURE — 25010000002 PROPOFOL 10 MG/ML EMULSION: Performed by: NURSE ANESTHETIST, CERTIFIED REGISTERED

## 2024-02-26 PROCEDURE — 25810000003 SODIUM CHLORIDE 0.9 % SOLUTION: Performed by: ANESTHESIOLOGY

## 2024-02-26 PROCEDURE — 88305 TISSUE EXAM BY PATHOLOGIST: CPT | Performed by: INTERNAL MEDICINE

## 2024-02-26 PROCEDURE — 45385 COLONOSCOPY W/LESION REMOVAL: CPT | Performed by: INTERNAL MEDICINE

## 2024-02-26 RX ORDER — SODIUM CHLORIDE 0.9 % (FLUSH) 0.9 %
10 SYRINGE (ML) INJECTION AS NEEDED
Status: DISCONTINUED | OUTPATIENT
Start: 2024-02-26 | End: 2024-02-26 | Stop reason: HOSPADM

## 2024-02-26 RX ORDER — SODIUM CHLORIDE 9 MG/ML
40 INJECTION, SOLUTION INTRAVENOUS AS NEEDED
Status: DISCONTINUED | OUTPATIENT
Start: 2024-02-26 | End: 2024-02-26 | Stop reason: HOSPADM

## 2024-02-26 RX ORDER — SODIUM CHLORIDE 9 MG/ML
100 INJECTION, SOLUTION INTRAVENOUS CONTINUOUS
Status: DISCONTINUED | OUTPATIENT
Start: 2024-02-26 | End: 2024-02-26 | Stop reason: HOSPADM

## 2024-02-26 RX ORDER — PROPOFOL 10 MG/ML
VIAL (ML) INTRAVENOUS AS NEEDED
Status: DISCONTINUED | OUTPATIENT
Start: 2024-02-26 | End: 2024-02-26 | Stop reason: SURG

## 2024-02-26 RX ORDER — SODIUM CHLORIDE 0.9 % (FLUSH) 0.9 %
10 SYRINGE (ML) INJECTION EVERY 12 HOURS SCHEDULED
Status: DISCONTINUED | OUTPATIENT
Start: 2024-02-26 | End: 2024-02-26 | Stop reason: HOSPADM

## 2024-02-26 RX ORDER — ONDANSETRON 2 MG/ML
4 INJECTION INTRAMUSCULAR; INTRAVENOUS ONCE AS NEEDED
Status: DISCONTINUED | OUTPATIENT
Start: 2024-02-26 | End: 2024-02-26 | Stop reason: HOSPADM

## 2024-02-26 RX ADMIN — PROPOFOL INJECTABLE EMULSION 80 MG: 10 INJECTION, EMULSION INTRAVENOUS at 11:53

## 2024-02-26 RX ADMIN — PROPOFOL INJECTABLE EMULSION 80 MG: 10 INJECTION, EMULSION INTRAVENOUS at 12:01

## 2024-02-26 RX ADMIN — PROPOFOL INJECTABLE EMULSION 80 MG: 10 INJECTION, EMULSION INTRAVENOUS at 11:57

## 2024-02-26 RX ADMIN — SODIUM CHLORIDE 100 ML/HR: 9 INJECTION, SOLUTION INTRAVENOUS at 10:58

## 2024-02-26 NOTE — ANESTHESIA POSTPROCEDURE EVALUATION
Patient: Alisha Kurtz    Procedure Summary       Date: 02/26/24 Room / Location: [facility] ENDOSCOPY 5 / [facility] ENDOSCOPY    Anesthesia Start: 1152 Anesthesia Stop: 1213    Procedure: COLONOSCOPY WITH ANESTHESIA Diagnosis:       Encounter for screening for malignant neoplasm of colon      (Encounter for screening for malignant neoplasm of colon [Z12.11])    Surgeons: Sarath Bhardwaj MD Provider: Daniel Rivers CRNA    Anesthesia Type: MAC ASA Status: 2            Anesthesia Type: MAC    Vitals  Vitals Value Taken Time   BP 93/68 02/26/24 1216   Temp     Pulse 80 02/26/24 1220   Resp 18 02/26/24 1213   SpO2 97 % 02/26/24 1220   Vitals shown include unfiled device data.        Post Anesthesia Care and Evaluation    Patient location during evaluation: PHASE II  Patient participation: complete - patient participated  Level of consciousness: awake and alert  Pain management: adequate    Airway patency: patent  Anesthetic complications: No anesthetic complications  PONV Status: none  Cardiovascular status: acceptable and stable  Respiratory status: acceptable and unassisted  Hydration status: acceptable

## 2024-02-26 NOTE — H&P
[facility]astroenterology  Pre Procedure History & Physical    Chief Complaint:   Screening    Subjective     HPI:   Screening    Past Medical History:   Past Medical History:   Diagnosis Date    COPD (chronic obstructive pulmonary disease)     Hypertension        Past Surgical History:  Past Surgical History:   Procedure Laterality Date     SECTION      KNEE ARTHROSCOPY      LAPAROSCOPIC LYSIS INTESTINAL ADHESIONS         Family History:  Family History   Problem Relation Age of Onset    Stroke Mother     Colon polyps Father     Heart disease Father     Breast cancer Maternal Grandmother     Breast cancer Paternal Aunt     Colon cancer Neg Hx        Social History:   reports that she has been smoking cigarettes. She has a 30.00 pack-year smoking history. She has never used smokeless tobacco. She reports current alcohol use. She reports that she does not use drugs.    Medications:   Prior to Admission medications    Medication Sig Start Date End Date Taking? Authorizing Provider   albuterol sulfate  (90 Base) MCG/ACT inhaler Inhale 2 puffs Every 4 (Four) Hours As Needed for Wheezing. 10/4/23  Yes Lux Umanzor MD   buPROPion XL (Wellbutrin XL) 150 MG 24 hr tablet Take 1 tablet by mouth Daily. 10/4/23  Yes Lux Umanzor MD   Cariprazine HCl (Vraylar) 1.5 MG capsule capsule Take 1 capsule by mouth Every Other Day. 24  Yes Lux Umanzor MD   fluticasone (FLONASE) 50 MCG/ACT nasal spray 2 sprays into the nostril(s) as directed by provider Daily. 24  Yes Lux Umanzor MD   hydroCHLOROthiazide (HYDRODIURIL) 12.5 MG tablet Take 1 tablet by mouth Daily. 24  Yes Lux Umanzor MD       Allergies:  Ceclor [cefaclor] and Hydrocodone-ibuprofen    ROS:    General: Weight stable  Resp: No SOA  Cardiovascular: No CP    Objective     Blood pressure 127/80, pulse 88, temperature 96.9 °F (36.1 °C), temperature source Temporal, resp. rate 17,  "height 167.6 cm (66\"), weight 72.6 kg (160 lb), SpO2 99%.    Physical Exam   Constitutional: Pt is oriented to person, place, and in no distress.   Cardiovascular: Normal rate, regular rhythm.    Pulmonary/Chest: Effort normal. No respiratory distress.   Abdominal: Non-distended.  Psychiatric: Mood, memory, affect and judgment appear normal.     Assessment & Plan     Diagnosis:  Screening    Anticipated Surgical Procedure:  Colonoscopy    The risks, benefits, and alternatives of this procedure have been discussed with the patient or the responsible party- the patient understands and agrees to proceed.    EMR Dragon/transcription disclaimer:  Much of this encounter note is electronic transcription/translation of spoken language to printed text.  The electronic translation of spoken language may be erroneous, or at times, nonsensical words or phrases may be inadvertently transcribed.  Although I have reviewed the note for such errors, some may still exist.  "

## 2024-02-26 NOTE — ANESTHESIA PREPROCEDURE EVALUATION
Anesthesia Evaluation     Patient summary reviewed   no history of anesthetic complications:   NPO Solid Status: > 8 hours             Airway   Mallampati: II  Dental      Pulmonary    (+) COPD,  (-) sleep apnea, no home oxygen  Cardiovascular     (+) hypertension      Neuro/Psych  (-) seizures, CVA  GI/Hepatic/Renal/Endo      Musculoskeletal     Abdominal    Substance History      OB/GYN          Other                          Anesthesia Plan    ASA 2     MAC     intravenous induction     Anesthetic plan, risks, benefits, and alternatives have been provided, discussed and informed consent has been obtained with: patient.        CODE STATUS:

## 2024-02-27 LAB
CYTO UR: NORMAL
LAB AP CASE REPORT: NORMAL
Lab: NORMAL
PATH REPORT.FINAL DX SPEC: NORMAL
PATH REPORT.GROSS SPEC: NORMAL

## 2024-03-14 ENCOUNTER — TELEPHONE (OUTPATIENT)
Dept: CT IMAGING | Facility: HOSPITAL | Age: 64
End: 2024-03-14
Payer: MEDICAID

## 2024-04-01 ENCOUNTER — HOSPITAL ENCOUNTER (OUTPATIENT)
Dept: CT IMAGING | Facility: HOSPITAL | Age: 64
Discharge: HOME | End: 2024-04-01
Admitting: FAMILY MEDICINE
Payer: MEDICAID

## 2024-04-01 DIAGNOSIS — R91.1 LUNG NODULE: ICD-10-CM

## 2024-04-01 PROCEDURE — 71250 CT THORAX DX C-: CPT

## 2024-04-24 ENCOUNTER — TELEPHONE (OUTPATIENT)
Dept: INTERNAL MEDICINE | Facility: CLINIC | Age: 64
End: 2024-04-24
Payer: MEDICAID

## 2024-04-24 NOTE — TELEPHONE ENCOUNTER
Attempted to call patient. Wanted to let her know that the lisinopril was discontinued at her last office visit and she only needs to take the hydrochlorothiazide. Will try again if patient doesn't return call.     Hub okay to read.

## 2024-04-24 NOTE — TELEPHONE ENCOUNTER
Hub staff attempted to follow warm transfer process and was unsuccessful     Caller: Alisha Kurtz    Relationship to patient: Self    Best call back number: [phone]     Patient is needing: TO KNOW IF SHE IS SUPPOSED TO BE TAKING THE LISINOPRIL WITH THE HYDROCHLOROTHIAZIDE?  SHE HAS NOT BEEN TAKING ANYTHING ELSE WITH IT.

## 2024-05-02 ENCOUNTER — OFFICE VISIT (OUTPATIENT)
Dept: INTERNAL MEDICINE | Facility: CLINIC | Age: 64
End: 2024-05-02
Payer: MEDICAID

## 2024-05-02 VITALS
BODY MASS INDEX: 27 KG/M2 | HEART RATE: 72 BPM | TEMPERATURE: 96.8 F | OXYGEN SATURATION: 96 % | SYSTOLIC BLOOD PRESSURE: 147 MMHG | DIASTOLIC BLOOD PRESSURE: 82 MMHG | WEIGHT: 168 LBS | HEIGHT: 66 IN

## 2024-05-02 DIAGNOSIS — Z23 NEED FOR SHINGLES VACCINE: ICD-10-CM

## 2024-05-02 DIAGNOSIS — M54.31 RIGHT SIDED SCIATICA: ICD-10-CM

## 2024-05-02 DIAGNOSIS — J43.9 PULMONARY EMPHYSEMA, UNSPECIFIED EMPHYSEMA TYPE: ICD-10-CM

## 2024-05-02 DIAGNOSIS — Z29.11 NEED FOR RSV IMMUNIZATION: ICD-10-CM

## 2024-05-02 DIAGNOSIS — Z23 NEED FOR TDAP VACCINATION: ICD-10-CM

## 2024-05-02 DIAGNOSIS — I10 PRIMARY HYPERTENSION: ICD-10-CM

## 2024-05-02 DIAGNOSIS — F33.1 MODERATE EPISODE OF RECURRENT MAJOR DEPRESSIVE DISORDER: Primary | ICD-10-CM

## 2024-05-02 PROBLEM — F32.9 REACTIVE DEPRESSION: Status: RESOLVED | Noted: 2017-03-21 | Resolved: 2024-05-02

## 2024-05-02 PROBLEM — Z72.0 TOBACCO ABUSE: Status: ACTIVE | Noted: 2024-05-02

## 2024-05-02 PROCEDURE — 90679 RSV VACC PREF RECOMB ADJT IM: CPT | Performed by: FAMILY MEDICINE

## 2024-05-02 PROCEDURE — 90472 IMMUNIZATION ADMIN EACH ADD: CPT | Performed by: FAMILY MEDICINE

## 2024-05-02 PROCEDURE — 96372 THER/PROPH/DIAG INJ SC/IM: CPT | Performed by: FAMILY MEDICINE

## 2024-05-02 PROCEDURE — 3077F SYST BP >= 140 MM HG: CPT | Performed by: FAMILY MEDICINE

## 2024-05-02 PROCEDURE — 99214 OFFICE O/P EST MOD 30 MIN: CPT | Performed by: FAMILY MEDICINE

## 2024-05-02 PROCEDURE — 90471 IMMUNIZATION ADMIN: CPT | Performed by: FAMILY MEDICINE

## 2024-05-02 PROCEDURE — 3079F DIAST BP 80-89 MM HG: CPT | Performed by: FAMILY MEDICINE

## 2024-05-02 PROCEDURE — 90750 HZV VACC RECOMBINANT IM: CPT | Performed by: FAMILY MEDICINE

## 2024-05-02 PROCEDURE — 90715 TDAP VACCINE 7 YRS/> IM: CPT | Performed by: FAMILY MEDICINE

## 2024-05-02 RX ORDER — KETOROLAC TROMETHAMINE 30 MG/ML
60 INJECTION, SOLUTION INTRAMUSCULAR; INTRAVENOUS ONCE
Status: COMPLETED | OUTPATIENT
Start: 2024-05-02 | End: 2024-05-02

## 2024-05-02 RX ORDER — IBUPROFEN 800 MG/1
800 TABLET ORAL EVERY 8 HOURS PRN
Qty: 90 TABLET | Refills: 2 | Status: SHIPPED | OUTPATIENT
Start: 2024-05-02

## 2024-05-02 RX ORDER — LISINOPRIL 10 MG/1
1 TABLET ORAL DAILY
COMMUNITY
Start: 2024-02-24 | End: 2024-05-02

## 2024-05-02 RX ORDER — MOMETASONE FUROATE AND FORMOTEROL FUMARATE DIHYDRATE 100; 5 UG/1; UG/1
2 AEROSOL RESPIRATORY (INHALATION)
Qty: 1 EACH | Refills: 11 | Status: SHIPPED | OUTPATIENT
Start: 2024-05-02

## 2024-05-02 RX ORDER — TOPIRAMATE 25 MG/1
25 TABLET ORAL 2 TIMES DAILY
Qty: 60 TABLET | Refills: 11 | Status: SHIPPED | OUTPATIENT
Start: 2024-05-02

## 2024-05-02 RX ORDER — HYDROCHLOROTHIAZIDE 25 MG/1
25 TABLET ORAL DAILY
Qty: 30 TABLET | Refills: 11 | Status: SHIPPED | OUTPATIENT
Start: 2024-05-02

## 2024-05-02 RX ORDER — TRIAMCINOLONE ACETONIDE 40 MG/ML
40 INJECTION, SUSPENSION INTRA-ARTICULAR; INTRAMUSCULAR ONCE
Status: COMPLETED | OUTPATIENT
Start: 2024-05-02 | End: 2024-05-02

## 2024-05-02 RX ADMIN — KETOROLAC TROMETHAMINE 60 MG: 30 INJECTION, SOLUTION INTRAMUSCULAR; INTRAVENOUS at 14:40

## 2024-05-02 RX ADMIN — TRIAMCINOLONE ACETONIDE 40 MG: 40 INJECTION, SUSPENSION INTRA-ARTICULAR; INTRAMUSCULAR at 14:41

## 2024-05-02 NOTE — LETTER
[facility]  Vaccine Consent Form    Patient Name:  Alisha Kurtz  Patient :  1960     Vaccine(s) Ordered    Arexvy RSV Vaccine (60+ yrs)  Tdap Vaccine Greater Than or Equal To 6yo IM  Shingrix Vaccine        Screening Checklist  The following questions should be completed prior to vaccination. If you answer “yes” to any question, it does not necessarily mean you should not be vaccinated. It just means we may need to clarify or ask more questions. If a question is unclear, please ask your healthcare provider to explain it.    Yes No   Any fever or moderate to severe illness today (mild illness and/or antibiotic treatment are not contraindications)?     Do you have a history of a serious reaction to any previous vaccinations, such as anaphylaxis, encephalopathy within 7 days, Guillain-Newell syndrome within 6 weeks, seizure?     Have you received any live vaccine(s) (e.g MMR, BLANCO) or any other vaccines in the last month (to ensure duplicate doses aren't given)?     Do you have an anaphylactic allergy to latex (DTaP, DTaP-IPV, Hep A, Hep B, MenB, RV, Td, Tdap), baker’s yeast (Hep B, HPV), polysorbates (RSV, nirsevimab, PCV 20, Rotavirrus, Tdap, Shingrix), or gelatin (BLANCO, MMR)?     Do you have an anaphylactic allergy to neomycin (Rabies, BLANCO, MMR, IPV, Hep A), polymyxin B (IPV), or streptomycin (IPV)?      Any cancer, leukemia, AIDS, or other immune system disorder? (BLANCO, MMR, RV)     Do you have a parent, brother, or sister with an immune system problem (if immune competence of vaccine recipient clinically verified, can proceed)? (MMR, BLANCO)     Any recent steroid treatments for >2 weeks, chemotherapy, or radiation treatment? (BLANCO, MMR)     Have you received antibody-containing blood transfusions or IVIG in the past 11 months (recommended interval is dependent on product)? (MMR, BLANCO)     Have you taken antiviral drugs (acyclovir, famciclovir, valacyclovir for BLANCO) in the last 24 or 48 hours, respectively?     "  Are you pregnant or planning to become pregnant within 1 month? (BLANCO, MMR, HPV, IPV, MenB, Abrexvy; For Hep B- refer to Engerix-B; For RSV - Abrysvo is indicated for 32-36 weeks of pregnancy from September to January)     For infants, have you ever been told your child has had intussusception or a medical emergency involving obstruction of the intestine (Rotavirus)? If not for an infant, can skip this question.         *Ordering Physicians/APC should be consulted if \"yes\" is checked by the patient or guardian above.  I have received, read, and understand the Vaccine Information Statement (VIS) for each vaccine ordered.  I have considered my or my child's health status as well as the health status of my close contacts.  I have taken the opportunity to discuss my vaccine questions with my or my child's health care provider.   I have requested that the ordered vaccine(s) be given to me or my child.  I understand the benefits and risks of the vaccines.  I understand that I should remain in the clinic for 15 minutes after receiving the vaccine(s).  _________________________________________________________  Signature of Patient or Parent/Legal Guardian ____________________  Date     "

## 2024-05-02 NOTE — PROGRESS NOTES
Subjective     Chief Complaint   Patient presents with    Hypertension     Hasn't taken bp meds for about a mo,  thought you had sub the water pill for it.     Depression       History of Present Illness    Patient's PMR from outside medical facility reviewed and noted.    Alisha Kurtz is a 64 y.o. female who presents for a routine visit at this time.  Comes in for recheck on her Vraylar for her reactive depression.  Patient states the medication continues to work fairly well for her and she is happy with her current dose.  Patient has done well after discontinuing the lisinopril blood sugar however is not as well-controlled we will go ahead and increase her hydrochlorothiazide to cover this at this time.      Would like to go ahead and get updated on her vaccinations at this time.  Go ahead and give her her shingles, RSV tetanus at this time.    Patient complains of some chronic lower back pain with pain radiating down her right leg initially complained of leg pain but due to the fact that her pain is in a sciatic nerve distribution we will go ahead and get x-rays of both the hip and the back.  Will get x-ray at this time for further evaluation.    His emphysema continues to bother her with some occasional shortness of breath.  Will go ahead and add Dulera long-acting inhaler to see if this will help.    negative unless otherwise specified above in HPI    Past Medical History:   Past Medical History:   Diagnosis Date    COPD (chronic obstructive pulmonary disease)     Hypertension      Past Surgical History:  Past Surgical History:   Procedure Laterality Date     SECTION      COLONOSCOPY N/A 2024    Procedure: COLONOSCOPY WITH ANESTHESIA;  Surgeon: Sarath Bhardwaj MD;  Location: [facility] ENDOSCOPY;  Service: Gastroenterology;  Laterality: N/A;  pre op screening   post op diverticulosis  pcp Leroy Christopher    KNEE ARTHROSCOPY      LAPAROSCOPIC LYSIS INTESTINAL ADHESIONS       Social History:  " reports that she has been smoking cigarettes. She has a 30 pack-year smoking history. She has never used smokeless tobacco. She reports current alcohol use. She reports that she does not use drugs.    Family History: family history includes Breast cancer in her maternal grandmother and paternal aunt; Colon polyps in her father; Heart disease in her father; Stroke in her mother.      Allergies:  Allergies   Allergen Reactions    Ceclor [Cefaclor] Hives    Hydrocodone-Ibuprofen Unknown - Low Severity    Lisinopril Cough     Medications:  Prior to Admission medications    Medication Sig Start Date End Date Taking? Authorizing Provider   albuterol sulfate  (90 Base) MCG/ACT inhaler Inhale 2 puffs Every 4 (Four) Hours As Needed for Wheezing. 10/4/23  Yes Lux Umanzor MD   buPROPion XL (Wellbutrin XL) 150 MG 24 hr tablet Take 1 tablet by mouth Daily. 10/4/23  Yes Lux Umanzor MD   lisinopril (PRINIVIL,ZESTRIL) 10 MG tablet Take 1 tablet by mouth Daily. 11/21/23  Yes Lux Umanzor MD   methylPREDNISolone (MEDROL) 4 MG dose pack Take as directed on package instructions. 10/4/23 1/8/24  Lux Umanzor MD           Objective     Vital Signs: /82 (BP Location: Left arm, Patient Position: Sitting, Cuff Size: Adult)   Pulse 72   Temp 96.8 °F (36 °C)   Ht 167.6 cm (66\")   Wt 76.2 kg (168 lb)   SpO2 96%   BMI 27.12 kg/m²   Physical Exam  Vitals and nursing note reviewed.   Constitutional:       General: She is not in acute distress.     Appearance: Normal appearance.   HENT:      Head: Normocephalic.   Eyes:      Extraocular Movements: Extraocular movements intact.      Pupils: Pupils are equal, round, and reactive to light.   Cardiovascular:      Rate and Rhythm: Normal rate and regular rhythm.      Heart sounds: Normal heart sounds. No murmur heard.  Pulmonary:      Effort: Pulmonary effort is normal. No respiratory distress.      Breath sounds: Normal breath " sounds. No rhonchi or rales.   Abdominal:      General: Abdomen is flat. Bowel sounds are normal.      Palpations: Abdomen is soft.   Neurological:      General: No focal deficit present.      Mental Status: She is alert.                    Results Reviewed:  Glucose   Date Value Ref Range Status   10/04/2023 80 70 - 99 mg/dL Final     BUN   Date Value Ref Range Status   10/04/2023 11 8 - 27 mg/dL Final     Creatinine   Date Value Ref Range Status   10/04/2023 0.84 0.57 - 1.00 mg/dL Final     Sodium   Date Value Ref Range Status   10/04/2023 141 134 - 144 mmol/L Final     Potassium   Date Value Ref Range Status   10/04/2023 4.5 3.5 - 5.2 mmol/L Final     Chloride   Date Value Ref Range Status   10/04/2023 101 96 - 106 mmol/L Final     Total CO2   Date Value Ref Range Status   10/04/2023 25 20 - 29 mmol/L Final     Calcium   Date Value Ref Range Status   10/04/2023 9.6 8.7 - 10.3 mg/dL Final     ALT (SGPT)   Date Value Ref Range Status   10/04/2023 15 0 - 32 IU/L Final   10/11/2018 12 5 - 33 U/L Final     AST (SGOT)   Date Value Ref Range Status   10/04/2023 18 0 - 40 IU/L Final   10/11/2018 18 5 - 32 U/L Final     WBC   Date Value Ref Range Status   10/04/2023 8.4 3.4 - 10.8 x10E3/uL Final     Hematocrit   Date Value Ref Range Status   10/04/2023 42.6 34.0 - 46.6 % Final     Platelets   Date Value Ref Range Status   10/04/2023 319 150 - 450 x10E3/uL Final     Triglycerides   Date Value Ref Range Status   10/04/2023 93 0 - 149 mg/dL Final   10/11/2018 80 0 - 149 mg/dL Final     HDL Cholesterol   Date Value Ref Range Status   10/04/2023 48 >39 mg/dL Final   10/11/2018 49 (L) 65 - 121 mg/dL Final     Comment:     VALUES>60 MG/DL ARE ASSOCIATED WITH A DECREASED RISK OF  ATHEROSCLEROTIC CARDIOVASCULAR DISEASE     LDL Cholesterol    Date Value Ref Range Status   10/11/2018 124 <100 mg/dL Final     Comment:     <100 MG/DL=OPITIMAL    100-129 MG/DL=DESIRABLE    130-159 MG/DL BORDERLINE=INCREASED RISK OF  ATHEROSCLEROTIC  CARDIOVASCULAR DISEASE    > OR = 160 MG/DL=ASSOCIATED WITH AN INCREASE RISK OF  ATHEROSCLEROTIC CARDIOVASCULAR DISEASE     LDL Chol Calc ([facility])   Date Value Ref Range Status   10/04/2023 137 (H) 0 - 99 mg/dL Final     The following data was reviewed by: Lux Umanzor MD on 05/02/2024:    Data reviewed : Radiologic studies xray lumbar spine and hip      Assessment / Plan     Assessment/Plan:   Diagnosis Plan   1. Moderate episode of recurrent major depressive disorder  topiramate (Topamax) 25 MG tablet      2. Primary hypertension  hydroCHLOROthiazide 25 MG tablet      3. Right sided sciatica  XR Spine Lumbar 4+ View (In Office)    ibuprofen (ADVIL,MOTRIN) 800 MG tablet    ketorolac (TORADOL) injection 60 mg    triamcinolone acetonide (KENALOG-40) injection 40 mg    XR Hip With or Without Pelvis 2 - 3 View Right      4. Need for Tdap vaccination  Tdap Vaccine Greater Than or Equal To 8yo IM      5. Need for shingles vaccine  Shingrix Vaccine      6. Pulmonary emphysema, unspecified emphysema type  mometasone-formoterol (Dulera) 100-5 MCG/ACT inhaler      7. Need for RSV immunization  Arexvy RSV Vaccine (60+ yrs)                  No follow-ups on file. unless patient needs to be seen sooner or acute issues arise.      I have discussed the patient results/orders and and plan/recommendation with them at today's visit.      Signed by:    Lux Umanzor MD Date: 05/02/24

## 2024-05-21 ENCOUNTER — OFFICE VISIT (OUTPATIENT)
Dept: INTERNAL MEDICINE | Facility: CLINIC | Age: 64
End: 2024-05-21
Payer: MEDICAID

## 2024-05-21 VITALS
SYSTOLIC BLOOD PRESSURE: 132 MMHG | WEIGHT: 163 LBS | DIASTOLIC BLOOD PRESSURE: 84 MMHG | OXYGEN SATURATION: 96 % | HEIGHT: 66 IN | TEMPERATURE: 96 F | BODY MASS INDEX: 26.2 KG/M2 | HEART RATE: 98 BPM

## 2024-05-21 DIAGNOSIS — L23.7 POISON IVY DERMATITIS: Primary | ICD-10-CM

## 2024-05-21 PROCEDURE — 99213 OFFICE O/P EST LOW 20 MIN: CPT | Performed by: FAMILY MEDICINE

## 2024-05-21 PROCEDURE — 3075F SYST BP GE 130 - 139MM HG: CPT | Performed by: FAMILY MEDICINE

## 2024-05-21 PROCEDURE — 96372 THER/PROPH/DIAG INJ SC/IM: CPT | Performed by: FAMILY MEDICINE

## 2024-05-21 PROCEDURE — 3079F DIAST BP 80-89 MM HG: CPT | Performed by: FAMILY MEDICINE

## 2024-05-21 RX ORDER — METHYLPREDNISOLONE SODIUM SUCCINATE 125 MG/2ML
125 INJECTION, POWDER, LYOPHILIZED, FOR SOLUTION INTRAMUSCULAR; INTRAVENOUS EVERY 6 HOURS
Status: COMPLETED | OUTPATIENT
Start: 2024-05-21 | End: 2024-05-21

## 2024-05-21 RX ADMIN — METHYLPREDNISOLONE SODIUM SUCCINATE 125 MG: 125 INJECTION, POWDER, LYOPHILIZED, FOR SOLUTION INTRAMUSCULAR; INTRAVENOUS at 10:37

## 2024-05-21 NOTE — PROGRESS NOTES
Subjective     Chief Complaint   Patient presents with    Poison Ivy       History of Present Illness    Patient's PMR from outside medical facility reviewed and noted.    Ailsha Kurtz is a 64 y.o. female who presents for a routine visit at this time.  She reports that she has been doing fairly well however comes in today with a rash the rash is covering bilateral hands and upper arms.  Patient states that she knows that it is poison ivy as she was getting out into the woods surrounding her property and pulling weeds.  She would like to go ahead and get something to help with this at this time.    Review of systems   negative unless otherwise specified above in HPI      Past Medical History:   Past Medical History:   Diagnosis Date    COPD (chronic obstructive pulmonary disease)     Hypertension      Past Surgical History:  Past Surgical History:   Procedure Laterality Date     SECTION      COLONOSCOPY N/A 2024    Procedure: COLONOSCOPY WITH ANESTHESIA;  Surgeon: Sarath Bhardwaj MD;  Location: [facility] ENDOSCOPY;  Service: Gastroenterology;  Laterality: N/A;  pre op screening   post op diverticulosis  pcp Leroy Christopher    KNEE ARTHROSCOPY      LAPAROSCOPIC LYSIS INTESTINAL ADHESIONS       Social History:  reports that she has been smoking cigarettes. She has a 30 pack-year smoking history. She has never used smokeless tobacco. She reports current alcohol use. She reports that she does not use drugs.    Family History: family history includes Breast cancer in her maternal grandmother and paternal aunt; Colon polyps in her father; Heart disease in her father; Stroke in her mother.      Allergies:  Allergies   Allergen Reactions    Ceclor [Cefaclor] Hives    Hydrocodone-Ibuprofen Unknown - Low Severity    Lisinopril Cough     Medications:  Prior to Admission medications    Medication Sig Start Date End Date Taking? Authorizing Provider   albuterol sulfate  (90 Base) MCG/ACT inhaler  "Inhale 2 puffs Every 4 (Four) Hours As Needed for Wheezing. 10/4/23  Yes Lux Umanzor MD   buPROPion XL (Wellbutrin XL) 150 MG 24 hr tablet Take 1 tablet by mouth Daily. 10/4/23  Yes Lux Umanzor MD   lisinopril (PRINIVIL,ZESTRIL) 10 MG tablet Take 1 tablet by mouth Daily. 11/21/23  Yes Lux Umanzor MD   methylPREDNISolone (MEDROL) 4 MG dose pack Take as directed on package instructions. 10/4/23 1/8/24  Lux Umanzor MD           Objective     Vital Signs: /84 (BP Location: Left arm, Patient Position: Sitting, Cuff Size: Adult)   Pulse 98   Temp 96 °F (35.6 °C) (Infrared)   Ht 167.6 cm (66\")   Wt 73.9 kg (163 lb)   SpO2 96%   BMI 26.31 kg/m²   Physical Exam  Vitals and nursing note reviewed.   Constitutional:       General: She is not in acute distress.     Appearance: Normal appearance.   HENT:      Head: Normocephalic.   Eyes:      Extraocular Movements: Extraocular movements intact.      Pupils: Pupils are equal, round, and reactive to light.   Cardiovascular:      Rate and Rhythm: Normal rate and regular rhythm.      Heart sounds: Normal heart sounds. No murmur heard.  Pulmonary:      Effort: Pulmonary effort is normal. No respiratory distress.      Breath sounds: Normal breath sounds. No rhonchi or rales.   Abdominal:      General: Abdomen is flat. Bowel sounds are normal.      Palpations: Abdomen is soft.   Neurological:      General: No focal deficit present.      Mental Status: She is alert.                      Results Reviewed:  Glucose   Date Value Ref Range Status   10/04/2023 80 70 - 99 mg/dL Final     BUN   Date Value Ref Range Status   10/04/2023 11 8 - 27 mg/dL Final     Creatinine   Date Value Ref Range Status   10/04/2023 0.84 0.57 - 1.00 mg/dL Final     Sodium   Date Value Ref Range Status   10/04/2023 141 134 - 144 mmol/L Final     Potassium   Date Value Ref Range Status   10/04/2023 4.5 3.5 - 5.2 mmol/L Final     Chloride   Date " Value Ref Range Status   10/04/2023 101 96 - 106 mmol/L Final     Total CO2   Date Value Ref Range Status   10/04/2023 25 20 - 29 mmol/L Final     Calcium   Date Value Ref Range Status   10/04/2023 9.6 8.7 - 10.3 mg/dL Final     ALT (SGPT)   Date Value Ref Range Status   10/04/2023 15 0 - 32 IU/L Final   10/11/2018 12 5 - 33 U/L Final     AST (SGOT)   Date Value Ref Range Status   10/04/2023 18 0 - 40 IU/L Final   10/11/2018 18 5 - 32 U/L Final     WBC   Date Value Ref Range Status   10/04/2023 8.4 3.4 - 10.8 x10E3/uL Final     Hematocrit   Date Value Ref Range Status   10/04/2023 42.6 34.0 - 46.6 % Final     Platelets   Date Value Ref Range Status   10/04/2023 319 150 - 450 x10E3/uL Final     Triglycerides   Date Value Ref Range Status   10/04/2023 93 0 - 149 mg/dL Final   10/11/2018 80 0 - 149 mg/dL Final     HDL Cholesterol   Date Value Ref Range Status   10/04/2023 48 >39 mg/dL Final   10/11/2018 49 (L) 65 - 121 mg/dL Final     Comment:     VALUES>60 MG/DL ARE ASSOCIATED WITH A DECREASED RISK OF  ATHEROSCLEROTIC CARDIOVASCULAR DISEASE     LDL Cholesterol    Date Value Ref Range Status   10/11/2018 124 <100 mg/dL Final     Comment:     <100 MG/DL=OPITIMAL    100-129 MG/DL=DESIRABLE    130-159 MG/DL BORDERLINE=INCREASED RISK OF ATHEROSCLEROTIC  CARDIOVASCULAR DISEASE    > OR = 160 MG/DL=ASSOCIATED WITH AN INCREASE RISK OF  ATHEROSCLEROTIC CARDIOVASCULAR DISEASE     LDL Chol Calc ([facility])   Date Value Ref Range Status   10/04/2023 137 (H) 0 - 99 mg/dL Final     The following data was reviewed by: Lux Umanzor MD on 05/02/2024:    Data reviewed : Radiologic studies xray lumbar spine and hip      Assessment / Plan     Assessment/Plan:   Diagnosis Plan   1. Poison ivy dermatitis  methylPREDNISolone sodium succinate (SOLU-Medrol) injection 125 mg                  No follow-ups on file. unless patient needs to be seen sooner or acute issues arise.      I have discussed the patient results/orders and and  plan/recommendation with them at today's visit.      Signed by:    Lux Umanzor MD Date: 05/21/24

## 2024-07-03 ENCOUNTER — TELEPHONE (OUTPATIENT)
Dept: INTERNAL MEDICINE | Facility: CLINIC | Age: 64
End: 2024-07-03
Payer: MEDICAID

## 2024-07-03 DIAGNOSIS — F33.1 MODERATE EPISODE OF RECURRENT MAJOR DEPRESSIVE DISORDER: ICD-10-CM

## 2024-07-03 DIAGNOSIS — I10 PRIMARY HYPERTENSION: ICD-10-CM

## 2024-07-03 RX ORDER — TOPIRAMATE 25 MG/1
25 TABLET ORAL 2 TIMES DAILY
Qty: 60 TABLET | Refills: 0 | Status: SHIPPED | OUTPATIENT
Start: 2024-07-03

## 2024-07-03 RX ORDER — HYDROCHLOROTHIAZIDE 25 MG/1
25 TABLET ORAL DAILY
Qty: 30 TABLET | Refills: 0 | Status: SHIPPED | OUTPATIENT
Start: 2024-07-03

## 2024-07-03 NOTE — TELEPHONE ENCOUNTER
Caller: Alisha Kurtz    Relationship: Self    Best call back number: [phone]    What is the best time to reach you: SOON PLEASE     Who are you requesting to speak with (clinical staff, provider,  specific staff member): CLINICAL STAFF       What was the call regarding: PATIENT REQUESTING A CALL BACK REGARDING PHARMACY NOT FILLING HER PRESCRIPTIONS STATING PCP WAS NO LONGER MEDICAID AFFILIATED     Is it okay if the provider responds through [employer]: PREFERS A CALL BACK

## 2024-08-13 DIAGNOSIS — M54.31 RIGHT SIDED SCIATICA: ICD-10-CM

## 2024-08-13 RX ORDER — IBUPROFEN 800 MG/1
800 TABLET ORAL EVERY 8 HOURS PRN
Qty: 90 TABLET | Refills: 2 | Status: SHIPPED | OUTPATIENT
Start: 2024-08-13

## 2024-10-10 DIAGNOSIS — Z72.0 TOBACCO ABUSE: ICD-10-CM

## 2024-10-10 DIAGNOSIS — F32.9 REACTIVE DEPRESSION: ICD-10-CM

## 2024-10-10 RX ORDER — BUPROPION HYDROCHLORIDE 150 MG/1
150 TABLET ORAL DAILY
Qty: 30 TABLET | Refills: 11 | Status: SHIPPED | OUTPATIENT
Start: 2024-10-10

## 2024-10-10 NOTE — TELEPHONE ENCOUNTER
Rx Refill Note  Requested Prescriptions     Pending Prescriptions Disp Refills    buPROPion XL (Wellbutrin XL) 150 MG 24 hr tablet 30 tablet 11     Sig: Take 1 tablet by mouth Daily.      Last office visit with prescribing clinician: 5/21/2024   Last telemedicine visit with prescribing clinician: Visit date not found   Next office visit with prescribing clinician: Visit date not found                         Would you like a call back once the refill request has been completed: [] Yes [] No    If the office needs to give you a call back, can they leave a voicemail: [] Yes [] No    Mecry Stevens RN  10/10/24, 09:48 CDT

## 2024-10-21 ENCOUNTER — TELEPHONE (OUTPATIENT)
Dept: MRI IMAGING | Facility: HOSPITAL | Age: 64
End: 2024-10-21
Payer: MEDICAID

## 2024-12-04 ENCOUNTER — TELEPHONE (OUTPATIENT)
Dept: MRI IMAGING | Facility: HOSPITAL | Age: 64
End: 2024-12-04
Payer: MEDICAID

## 2024-12-04 NOTE — TELEPHONE ENCOUNTER
I tried to call and schedule f/u imaging of chest. Unable to reach patient or leave voicemail.

## 2025-01-21 ENCOUNTER — TELEPHONE (OUTPATIENT)
Dept: MRI IMAGING | Facility: HOSPITAL | Age: 65
End: 2025-01-21

## 2025-01-21 NOTE — TELEPHONE ENCOUNTER
Multiple attempts by phone and letter have been made to contact patient about CT lung ca screening being past due. Unable to reach patient. Patient tracking deactivated by [facility].

## (undated) DEVICE — TP SXN YANKR W/VENT STRL

## (undated) DEVICE — MSK O2 CONCENTR/MD CONN/UNIV A/ 7FT DISP

## (undated) DEVICE — CUFF BP 1TB CONN/BAYO A/

## (undated) DEVICE — SENSR O2 OXIMAX FNGR A/ 18IN NONSTR

## (undated) DEVICE — KT VLV BIOP DEFENDO SXN AIR/WATER

## (undated) DEVICE — SNAR POLYP CAPTIVATOR RND STFF 2.4 240CM 10MM 1P/U

## (undated) DEVICE — TRAP POLYP ETRAP 2PK

## (undated) DEVICE — BRSH CLN CH 1.7MM 230CM 5TO7MM